# Patient Record
Sex: FEMALE | Race: WHITE | Employment: OTHER | URBAN - METROPOLITAN AREA
[De-identification: names, ages, dates, MRNs, and addresses within clinical notes are randomized per-mention and may not be internally consistent; named-entity substitution may affect disease eponyms.]

---

## 2018-03-16 PROCEDURE — 81001 URINALYSIS AUTO W/SCOPE: CPT | Performed by: FAMILY MEDICINE

## 2018-05-21 ENCOUNTER — LAB ENCOUNTER (OUTPATIENT)
Dept: LAB | Age: 68
End: 2018-05-21
Attending: PODIATRIST
Payer: MEDICARE

## 2018-05-21 DIAGNOSIS — L03.111 CELLULITIS OF RIGHT AXILLA: Primary | ICD-10-CM

## 2018-05-21 PROCEDURE — 85652 RBC SED RATE AUTOMATED: CPT

## 2018-05-21 PROCEDURE — 36415 COLL VENOUS BLD VENIPUNCTURE: CPT

## 2018-05-21 PROCEDURE — 86140 C-REACTIVE PROTEIN: CPT

## 2018-05-21 PROCEDURE — 85025 COMPLETE CBC W/AUTO DIFF WBC: CPT

## 2018-05-21 PROCEDURE — 84550 ASSAY OF BLOOD/URIC ACID: CPT

## 2018-10-15 PROBLEM — Z90.710 H/O ABDOMINAL HYSTERECTOMY: Status: ACTIVE | Noted: 2018-10-15

## 2019-07-08 PROCEDURE — 81001 URINALYSIS AUTO W/SCOPE: CPT | Performed by: FAMILY MEDICINE

## 2019-07-18 PROBLEM — Z90.710 H/O ABDOMINAL HYSTERECTOMY: Status: RESOLVED | Noted: 2018-10-15 | Resolved: 2019-07-18

## 2019-07-24 ENCOUNTER — OFFICE VISIT (OUTPATIENT)
Dept: FAMILY MEDICINE CLINIC | Facility: CLINIC | Age: 69
End: 2019-07-24
Payer: MEDICARE

## 2019-07-24 VITALS
SYSTOLIC BLOOD PRESSURE: 160 MMHG | TEMPERATURE: 98 F | HEIGHT: 61.5 IN | HEART RATE: 82 BPM | BODY MASS INDEX: 24.79 KG/M2 | OXYGEN SATURATION: 98 % | RESPIRATION RATE: 18 BRPM | WEIGHT: 133 LBS | DIASTOLIC BLOOD PRESSURE: 90 MMHG

## 2019-07-24 DIAGNOSIS — R39.9 UTI SYMPTOMS: ICD-10-CM

## 2019-07-24 DIAGNOSIS — N30.00 ACUTE CYSTITIS WITHOUT HEMATURIA: Primary | ICD-10-CM

## 2019-07-24 LAB
BILIRUBIN: NEGATIVE
GLUCOSE (URINE DIPSTICK): NEGATIVE MG/DL
KETONES (URINE DIPSTICK): NEGATIVE MG/DL
MULTISTIX LOT#: NORMAL NUMERIC
NITRITE, URINE: NEGATIVE
PH, URINE: 7.5 (ref 4.5–8)
PROTEIN (URINE DIPSTICK): NEGATIVE MG/DL
SPECIFIC GRAVITY: 1.01 (ref 1–1.03)
URINE-COLOR: YELLOW
UROBILINOGEN,SEMI-QN: 0.2 MG/DL (ref 0–1.9)

## 2019-07-24 PROCEDURE — 87086 URINE CULTURE/COLONY COUNT: CPT | Performed by: NURSE PRACTITIONER

## 2019-07-24 PROCEDURE — 99202 OFFICE O/P NEW SF 15 MIN: CPT | Performed by: NURSE PRACTITIONER

## 2019-07-24 PROCEDURE — 81003 URINALYSIS AUTO W/O SCOPE: CPT | Performed by: NURSE PRACTITIONER

## 2019-07-24 RX ORDER — NITROFURANTOIN 25; 75 MG/1; MG/1
100 CAPSULE ORAL 2 TIMES DAILY
Qty: 14 CAPSULE | Refills: 0 | Status: SHIPPED | OUTPATIENT
Start: 2019-07-24 | End: 2019-07-31

## 2019-07-24 NOTE — PROGRESS NOTES
CHIEF COMPLAINT:   Patient presents with:  Urinary Frequency: s/s for 4 days. No OTC meds taken      HPI:   Lea Kirby is a 78year old female who presents with symptoms of UTI. Complaining of urinary frequency, urgency, dysuria for 4 days.   Symptom Drug use: No        REVIEW OF SYSTEMS:   GENERAL: See above  SKIN: no rashes, no skin wounds or ulcers. GI: See HPI. : See HPI. NEURO: no headaches.     EXAM:   /90   Pulse 82   Temp 98.1 °F (36.7 °C) (Oral)   Resp 18   Ht 61.5\"   Wt 133 lb The patient is asked to see PCP in 3 days if not better. Seek care immediately for new onset of fever, vomiting, worsening symptoms.     Patient Instructions       Understanding Urinary Tract Infections (UTIs)  Most UTIs are caused by bacteria, although the

## 2019-08-08 ENCOUNTER — HOSPITAL ENCOUNTER (OUTPATIENT)
Dept: GENERAL RADIOLOGY | Age: 69
Discharge: HOME | End: 2019-08-08
Attending: NURSE PRACTITIONER
Payer: MEDICARE

## 2019-08-08 DIAGNOSIS — K44.9 HIATAL HERNIA: ICD-10-CM

## 2019-08-08 PROCEDURE — 74220 X-RAY XM ESOPHAGUS 1CNTRST: CPT | Performed by: NURSE PRACTITIONER

## 2019-10-17 ENCOUNTER — APPOINTMENT (OUTPATIENT)
Dept: LAB | Age: 69
End: 2019-10-17
Payer: MEDICARE

## 2019-10-17 DIAGNOSIS — E87.6 HYPOKALEMIA: ICD-10-CM

## 2019-10-17 DIAGNOSIS — E78.00 PURE HYPERCHOLESTEROLEMIA: ICD-10-CM

## 2019-10-17 DIAGNOSIS — K44.9 PARAESOPHAGEAL HERNIA: ICD-10-CM

## 2019-10-17 PROCEDURE — 93010 ELECTROCARDIOGRAM REPORT: CPT | Performed by: INTERNAL MEDICINE

## 2019-10-17 PROCEDURE — 80053 COMPREHEN METABOLIC PANEL: CPT

## 2019-10-17 PROCEDURE — 36415 COLL VENOUS BLD VENIPUNCTURE: CPT

## 2019-10-17 PROCEDURE — 93005 ELECTROCARDIOGRAM TRACING: CPT

## 2019-10-17 PROCEDURE — 80061 LIPID PANEL: CPT

## 2019-10-18 ENCOUNTER — ANESTHESIA EVENT (OUTPATIENT)
Dept: SURGERY | Facility: HOSPITAL | Age: 69
End: 2019-10-18

## 2019-10-18 NOTE — PAT NURSING NOTE
Chart reviewed by Dr. Kavon Cano for low K+ and abnormal EKG. Received an order to notify PCP of K+ and request oral supplementation to correct over the weekend. Repeat K+ on admit. Faxed the lab results to Dr. Mary Carmen Hinton and telephoned his office.  I spoke with

## 2019-10-21 ENCOUNTER — HOSPITAL ENCOUNTER (INPATIENT)
Facility: HOSPITAL | Age: 69
LOS: 2 days | Discharge: HOME | DRG: 327 | End: 2019-10-23
Attending: SURGERY | Admitting: SURGERY
Payer: MEDICARE

## 2019-10-21 ENCOUNTER — ANESTHESIA (OUTPATIENT)
Dept: SURGERY | Facility: HOSPITAL | Age: 69
End: 2019-10-21

## 2019-10-21 DIAGNOSIS — K44.9 PARAESOPHAGEAL HERNIA: Primary | ICD-10-CM

## 2019-10-21 PROCEDURE — 84132 ASSAY OF SERUM POTASSIUM: CPT | Performed by: ANESTHESIOLOGY

## 2019-10-21 PROCEDURE — 0BQT0ZZ REPAIR DIAPHRAGM, OPEN APPROACH: ICD-10-PCS | Performed by: SURGERY

## 2019-10-21 PROCEDURE — 0DV40ZZ RESTRICTION OF ESOPHAGOGASTRIC JUNCTION, OPEN APPROACH: ICD-10-PCS | Performed by: SURGERY

## 2019-10-21 PROCEDURE — 8E0W0CZ ROBOTIC ASSISTED PROCEDURE OF TRUNK REGION, OPEN APPROACH: ICD-10-PCS | Performed by: SURGERY

## 2019-10-21 RX ORDER — HYDROCODONE BITARTRATE AND ACETAMINOPHEN 5; 325 MG/1; MG/1
1 TABLET ORAL EVERY 4 HOURS PRN
Status: DISCONTINUED | OUTPATIENT
Start: 2019-10-21 | End: 2019-10-23

## 2019-10-21 RX ORDER — HYDROMORPHONE HYDROCHLORIDE 1 MG/ML
0.4 INJECTION, SOLUTION INTRAMUSCULAR; INTRAVENOUS; SUBCUTANEOUS EVERY 5 MIN PRN
Status: DISCONTINUED | OUTPATIENT
Start: 2019-10-21 | End: 2019-10-21 | Stop reason: HOSPADM

## 2019-10-21 RX ORDER — METOPROLOL TARTRATE 5 MG/5ML
5 INJECTION INTRAVENOUS EVERY 12 HOURS
Status: DISCONTINUED | OUTPATIENT
Start: 2019-10-21 | End: 2019-10-22

## 2019-10-21 RX ORDER — HYDROCODONE BITARTRATE AND ACETAMINOPHEN 5; 325 MG/1; MG/1
2 TABLET ORAL EVERY 4 HOURS PRN
Status: DISCONTINUED | OUTPATIENT
Start: 2019-10-21 | End: 2019-10-23

## 2019-10-21 RX ORDER — HEPARIN SODIUM 5000 [USP'U]/ML
5000 INJECTION, SOLUTION INTRAVENOUS; SUBCUTANEOUS ONCE
Status: COMPLETED | OUTPATIENT
Start: 2019-10-21 | End: 2019-10-21

## 2019-10-21 RX ORDER — KETOROLAC TROMETHAMINE 30 MG/ML
30 INJECTION, SOLUTION INTRAMUSCULAR; INTRAVENOUS EVERY 6 HOURS PRN
Status: DISPENSED | OUTPATIENT
Start: 2019-10-21 | End: 2019-10-23

## 2019-10-21 RX ORDER — MIDAZOLAM HYDROCHLORIDE 1 MG/ML
1 INJECTION INTRAMUSCULAR; INTRAVENOUS EVERY 5 MIN PRN
Status: DISCONTINUED | OUTPATIENT
Start: 2019-10-21 | End: 2019-10-21 | Stop reason: HOSPADM

## 2019-10-21 RX ORDER — MEPERIDINE HYDROCHLORIDE 25 MG/ML
12.5 INJECTION INTRAMUSCULAR; INTRAVENOUS; SUBCUTANEOUS AS NEEDED
Status: DISCONTINUED | OUTPATIENT
Start: 2019-10-21 | End: 2019-10-21 | Stop reason: HOSPADM

## 2019-10-21 RX ORDER — SODIUM CHLORIDE, SODIUM LACTATE, POTASSIUM CHLORIDE, CALCIUM CHLORIDE 600; 310; 30; 20 MG/100ML; MG/100ML; MG/100ML; MG/100ML
INJECTION, SOLUTION INTRAVENOUS CONTINUOUS
Status: DISCONTINUED | OUTPATIENT
Start: 2019-10-21 | End: 2019-10-21 | Stop reason: HOSPADM

## 2019-10-21 RX ORDER — ONDANSETRON 2 MG/ML
4 INJECTION INTRAMUSCULAR; INTRAVENOUS EVERY 6 HOURS PRN
Status: DISCONTINUED | OUTPATIENT
Start: 2019-10-21 | End: 2019-10-23

## 2019-10-21 RX ORDER — LABETALOL HYDROCHLORIDE 5 MG/ML
5 INJECTION, SOLUTION INTRAVENOUS EVERY 5 MIN PRN
Status: DISCONTINUED | OUTPATIENT
Start: 2019-10-21 | End: 2019-10-21 | Stop reason: HOSPADM

## 2019-10-21 RX ORDER — BUPIVACAINE HYDROCHLORIDE AND EPINEPHRINE 5; 5 MG/ML; UG/ML
INJECTION, SOLUTION EPIDURAL; INTRACAUDAL; PERINEURAL AS NEEDED
Status: DISCONTINUED | OUTPATIENT
Start: 2019-10-21 | End: 2019-10-21

## 2019-10-21 RX ORDER — BACITRACIN 50000 [USP'U]/1
INJECTION, POWDER, LYOPHILIZED, FOR SOLUTION INTRAMUSCULAR AS NEEDED
Status: DISCONTINUED | OUTPATIENT
Start: 2019-10-21 | End: 2019-10-21

## 2019-10-21 RX ORDER — HEPARIN SODIUM 5000 [USP'U]/ML
5000 INJECTION, SOLUTION INTRAVENOUS; SUBCUTANEOUS EVERY 12 HOURS SCHEDULED
Status: DISCONTINUED | OUTPATIENT
Start: 2019-10-21 | End: 2019-10-23

## 2019-10-21 RX ORDER — BENAZEPRIL/HYDROCHLOROTHIAZIDE 20 MG-25MG
1 TABLET ORAL DAILY
Status: ON HOLD | COMMUNITY
End: 2019-10-23

## 2019-10-21 RX ORDER — DEXTROSE, SODIUM CHLORIDE, AND POTASSIUM CHLORIDE 5; .45; .15 G/100ML; G/100ML; G/100ML
INJECTION INTRAVENOUS CONTINUOUS
Status: DISCONTINUED | OUTPATIENT
Start: 2019-10-21 | End: 2019-10-23

## 2019-10-21 RX ORDER — KETOROLAC TROMETHAMINE 15 MG/ML
15 INJECTION, SOLUTION INTRAMUSCULAR; INTRAVENOUS EVERY 6 HOURS PRN
Status: DISPENSED | OUTPATIENT
Start: 2019-10-21 | End: 2019-10-23

## 2019-10-21 RX ORDER — ACETAMINOPHEN 500 MG
1000 TABLET ORAL ONCE
Status: DISCONTINUED | OUTPATIENT
Start: 2019-10-21 | End: 2019-10-21

## 2019-10-21 RX ORDER — HYDROMORPHONE HYDROCHLORIDE 1 MG/ML
0.8 INJECTION, SOLUTION INTRAMUSCULAR; INTRAVENOUS; SUBCUTANEOUS EVERY 2 HOUR PRN
Status: DISCONTINUED | OUTPATIENT
Start: 2019-10-21 | End: 2019-10-23

## 2019-10-21 RX ORDER — HYDROMORPHONE HYDROCHLORIDE 1 MG/ML
1.2 INJECTION, SOLUTION INTRAMUSCULAR; INTRAVENOUS; SUBCUTANEOUS EVERY 2 HOUR PRN
Status: DISCONTINUED | OUTPATIENT
Start: 2019-10-21 | End: 2019-10-23

## 2019-10-21 RX ORDER — CEFAZOLIN SODIUM/WATER 2 G/20 ML
2 SYRINGE (ML) INTRAVENOUS ONCE
Status: COMPLETED | OUTPATIENT
Start: 2019-10-21 | End: 2019-10-21

## 2019-10-21 RX ORDER — METOCLOPRAMIDE HYDROCHLORIDE 5 MG/ML
10 INJECTION INTRAMUSCULAR; INTRAVENOUS EVERY 6 HOURS PRN
Status: DISCONTINUED | OUTPATIENT
Start: 2019-10-21 | End: 2019-10-23

## 2019-10-21 RX ORDER — CEFAZOLIN SODIUM/WATER 2 G/20 ML
2 SYRINGE (ML) INTRAVENOUS EVERY 8 HOURS
Status: COMPLETED | OUTPATIENT
Start: 2019-10-21 | End: 2019-10-21

## 2019-10-21 RX ORDER — HYDROMORPHONE HYDROCHLORIDE 1 MG/ML
0.4 INJECTION, SOLUTION INTRAMUSCULAR; INTRAVENOUS; SUBCUTANEOUS EVERY 2 HOUR PRN
Status: DISCONTINUED | OUTPATIENT
Start: 2019-10-21 | End: 2019-10-23

## 2019-10-21 RX ORDER — ONDANSETRON 2 MG/ML
4 INJECTION INTRAMUSCULAR; INTRAVENOUS AS NEEDED
Status: DISCONTINUED | OUTPATIENT
Start: 2019-10-21 | End: 2019-10-21 | Stop reason: HOSPADM

## 2019-10-21 RX ORDER — NALOXONE HYDROCHLORIDE 0.4 MG/ML
80 INJECTION, SOLUTION INTRAMUSCULAR; INTRAVENOUS; SUBCUTANEOUS AS NEEDED
Status: DISCONTINUED | OUTPATIENT
Start: 2019-10-21 | End: 2019-10-21 | Stop reason: HOSPADM

## 2019-10-21 NOTE — ANESTHESIA POSTPROCEDURE EVALUATION
[address] Patient Status:  Surgery Admit - Inpt   Age/Gender 78year old female MRN [id number]   [facility] SURGERY Attending Mary Cunningham MD   Hosp Day # 0 PCP Zahira Alexander DO       Anesthesia Post-op Note    Procedu

## 2019-10-21 NOTE — PLAN OF CARE
Problem: Patient/Family Goals  Goal: Patient/Family Long Term Goal  Description  Patient's Long Term Goal: Pt wants to go home    Interventions:  - Early ambulation  - Pain control  - Tolerate diet  - See additional Care Plan goals for specific intervent based on assessment  - Modify environment to reduce risk of injury  - Provide assistive devices as appropriate  - Consider OT/PT consult to assist with strengthening/mobility  - Encourage toileting schedule  Outcome: Progressing     Problem: DISCHARGE PLAN

## 2019-10-21 NOTE — OPERATIVE REPORT
[facility]    PATIENT'S NAME: Adrienne Phillips   ATTENDING PHYSICIAN: Poli Shelby M.D. OPERATING PHYSICIAN: Poli Shelby M.D.    PATIENT ACCOUNT#:   [de-identified]    LOCATION:  OR  OR POOL ROOMS 2 EDWP 10  MEDICAL RECORD #:   [id number]       DATE OF WOJCIECH The left olga of the diaphragm was then identified. Once this plane was developed, a Penrose drain was placed around the esophagus through this window for retraction.   Further dissection of the fundus and cardia portion of the stomach was performed to all

## 2019-10-21 NOTE — BRIEF OP NOTE
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

## 2019-10-21 NOTE — H&P
HPI:     Autumn Cadena is a 78year old female who presents for evaluation of Paraesophageal hernia. Patient has had a known paraesophageal hernia for a few years. This was demonstrated on a CT scan in 2016.  Patient has had worsening reflux disease and so       No Known Allergies           Family History   Problem Relation Age of Onset   • Breast Cancer Mother 80         [de-identified]   • Cancer Brother           pancreatic         Social History    Tobacco Use      Smoking status: Never Smoker      Smokeless tobac

## 2019-10-21 NOTE — CONSULTS
General Medicine Consult      Reason for consult: Post op medical management    Consulted by: Dr. Odin Hancock    PCP: Vinh Rice DO      History of Present Illness: Patient is a 78year old female with PMH sig for HTN, HLD, GERD, depression, and anxiety who Tab, Take 1 tablet (40 mg total) by mouth once daily. , Disp: 90 tablet, Rfl: 3  clonazePAM 0.25 MG Oral Tablet Dispersible, Take 0.25 mg by mouth as needed.   , Disp: , Rfl:   Venlafaxine HCl ER (EFFEXOR XR) 150 MG Oral Capsule SR 24 Hr, Take 150 mg by mout Normal PMI. Abd: Abdomen soft, nontender, nondistended, no organomegaly, bowel sounds present. Lap incisional clean.    MSK: Full range of motion in extremities, no clubbing, no cyanosis  Skin: no rashes or lesions  Neuro:  Grossly intact, no sensory de

## 2019-10-21 NOTE — ANESTHESIA PREPROCEDURE EVALUATION
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

## 2019-10-22 ENCOUNTER — APPOINTMENT (OUTPATIENT)
Dept: GENERAL RADIOLOGY | Facility: HOSPITAL | Age: 69
DRG: 327 | End: 2019-10-22
Attending: SURGERY
Payer: MEDICARE

## 2019-10-22 PROCEDURE — 80048 BASIC METABOLIC PNL TOTAL CA: CPT | Performed by: SURGERY

## 2019-10-22 PROCEDURE — 85027 COMPLETE CBC AUTOMATED: CPT | Performed by: SURGERY

## 2019-10-22 PROCEDURE — 74220 X-RAY XM ESOPHAGUS 1CNTRST: CPT | Performed by: SURGERY

## 2019-10-22 RX ORDER — HYDRALAZINE HYDROCHLORIDE 20 MG/ML
10 INJECTION INTRAMUSCULAR; INTRAVENOUS EVERY 4 HOURS PRN
Status: DISCONTINUED | OUTPATIENT
Start: 2019-10-22 | End: 2019-10-23

## 2019-10-22 RX ORDER — VENLAFAXINE HYDROCHLORIDE 75 MG/1
150 CAPSULE, EXTENDED RELEASE ORAL DAILY
Status: DISCONTINUED | OUTPATIENT
Start: 2019-10-22 | End: 2019-10-23

## 2019-10-22 RX ORDER — METOPROLOL SUCCINATE 100 MG/1
100 TABLET, EXTENDED RELEASE ORAL
Status: DISCONTINUED | OUTPATIENT
Start: 2019-10-22 | End: 2019-10-23

## 2019-10-22 RX ORDER — METOPROLOL SUCCINATE 100 MG/1
100 TABLET, EXTENDED RELEASE ORAL DAILY
Status: DISCONTINUED | OUTPATIENT
Start: 2019-10-22 | End: 2019-10-22

## 2019-10-22 RX ORDER — ATORVASTATIN CALCIUM 20 MG/1
20 TABLET, FILM COATED ORAL NIGHTLY
Status: DISCONTINUED | OUTPATIENT
Start: 2019-10-22 | End: 2019-10-23

## 2019-10-22 RX ORDER — PANTOPRAZOLE SODIUM 20 MG/1
20 TABLET, DELAYED RELEASE ORAL
Status: DISCONTINUED | OUTPATIENT
Start: 2019-10-22 | End: 2019-10-23

## 2019-10-22 RX ORDER — BENAZEPRIL/HYDROCHLOROTHIAZIDE 20 MG-25MG
1 TABLET ORAL DAILY
Status: DISCONTINUED | OUTPATIENT
Start: 2019-10-22 | End: 2019-10-22 | Stop reason: SDUPTHER

## 2019-10-22 NOTE — PROGRESS NOTES
10/22/19 ORDER RECEIVED TO DISCONTINUE BARROS CATHETER AT THIS TIME. BARROS CATHETER REMOVED BY ROSE WASHINGTON WITHOUT ANY PROBLEMS REPORTED TO WRITER. PT DTV. PT AWARE AND AGREES. ALL QUESTIONS ANSWERED. CONT TO MONITOR.

## 2019-10-22 NOTE — PROGRESS NOTES
[facility]  Progress Note    Richie Black Patient Status:  Inpatient    1950 MRN [id number]   [facility] 3NW-A Attending Dina Meadows MD   [facility] Day # 1 PCP Rodriguez Gilbert DO     Subjective:    No complaints.  Tolerating clear liqu

## 2019-10-22 NOTE — PROGRESS NOTES
[facility] Hospitalist Progress Note                                                                   [address]  6/1/1950    CC: FU post op    Interval History:  - Doing well, starting CLD 27.0  --  25.0           Assessment/Plan:       78 yr old female with PMH sig for HTN, HLD, GERD, depression, and anxiety who presents s/p elective repair of a paraesophageal hernia.      #Paraesophageal hernia s/p repair   · Post op care per general surge

## 2019-10-23 VITALS
DIASTOLIC BLOOD PRESSURE: 59 MMHG | RESPIRATION RATE: 18 BRPM | HEART RATE: 70 BPM | SYSTOLIC BLOOD PRESSURE: 131 MMHG | WEIGHT: 133.81 LBS | OXYGEN SATURATION: 97 % | HEIGHT: 62 IN | BODY MASS INDEX: 24.63 KG/M2 | TEMPERATURE: 98 F

## 2019-10-23 RX ORDER — METOPROLOL SUCCINATE 100 MG/1
100 TABLET, EXTENDED RELEASE ORAL 2 TIMES DAILY
Qty: 90 TABLET | Refills: 0 | Status: SHIPPED | OUTPATIENT
Start: 2019-10-23 | End: 2019-12-22

## 2019-10-23 RX ORDER — LOSARTAN POTASSIUM 50 MG/1
50 TABLET ORAL DAILY
Status: DISCONTINUED | OUTPATIENT
Start: 2019-10-23 | End: 2019-10-23

## 2019-10-23 RX ORDER — HYDROCODONE BITARTRATE AND ACETAMINOPHEN 5; 325 MG/1; MG/1
1 TABLET ORAL EVERY 6 HOURS PRN
Qty: 20 TABLET | Refills: 0 | Status: SHIPPED | OUTPATIENT
Start: 2019-10-23 | End: 2020-02-26 | Stop reason: ALTCHOICE

## 2019-10-23 RX ORDER — AMLODIPINE BESYLATE 5 MG/1
10 TABLET ORAL DAILY
Status: DISCONTINUED | OUTPATIENT
Start: 2019-10-23 | End: 2019-10-23

## 2019-10-23 RX ORDER — LOSARTAN POTASSIUM 50 MG/1
50 TABLET ORAL DAILY
Qty: 30 TABLET | Refills: 0 | Status: SHIPPED | OUTPATIENT
Start: 2019-10-24 | End: 2019-11-21

## 2019-10-23 RX ORDER — AMLODIPINE BESYLATE 5 MG/1
5 TABLET ORAL DAILY
Status: DISCONTINUED | OUTPATIENT
Start: 2019-10-23 | End: 2019-10-23

## 2019-10-23 NOTE — PLAN OF CARE
Problem: Patient/Family Goals  Goal: Patient/Family Long Term Goal  Description  Patient's Long Term Goal: Pt wants to go home    Interventions:  - Early ambulation  - Pain control  - Tolerate diet  - See additional Care Plan goals for specific intervent based on assessment  - Modify environment to reduce risk of injury  - Provide assistive devices as appropriate  - Consider OT/PT consult to assist with strengthening/mobility  - Encourage toileting schedule  Outcome: Progressing     Problem: DISCHARGE PLAN Progressing     Problem: GASTROINTESTINAL - ADULT  Goal: Minimal or absence of nausea and vomiting  Description  INTERVENTIONS:  - Maintain adequate hydration with IV or PO as ordered and tolerated  - Nasogastric tube to low intermittent suction as ordered

## 2019-10-23 NOTE — PROGRESS NOTES
[facility] Hospitalist Progress Note                                                                   [address]  6/1/1950    CC: FU post op    Interval History:  - BP high overnight- home BP --  137   K 2.9* 3.4* 4.6     --  105   CO2 27.0  --  25.0           Assessment/Plan:       78 yr old female with PMH sig for HTN, HLD, GERD, depression, and anxiety who presents s/p elective repair of a paraesophageal hernia.      #Paraesophageal he

## 2019-10-23 NOTE — PROGRESS NOTES
[facility]  Progress Note    Marcellus Llanes Patient Status:  Inpatient    1950 MRN [id number]   [facility] 3NW-A Attending Vik Gutierrez MD   [facility] Day # 2 PCP Ronald Ferrer DO     Subjective:    Patient reports tolerating liquid di

## 2019-10-23 NOTE — PLAN OF CARE
Patient is alert and oriented x3  Up ad bella   Voids  Lap sites CDI to ABD  Passing gas  Had a BM overnight  Tolerating soft    Patient assessed and ready for DC home  DC instructions given to patient. Verbalized understanding.   All questions answered to pa Adequate for Discharge     Problem: SAFETY ADULT - FALL  Goal: Free from fall injury  Description  INTERVENTIONS:  - Assess pt frequently for physical needs  - Identify cognitive and physical deficits and behaviors that affect risk of falls.   - Cisco f Discharge  Goal: Incision(s), wounds(s) or drain site(s) healing without S/S of infection  Description  INTERVENTIONS:  - Assess and document risk factors for pressure ulcer development  - Assess and document skin integrity  - Assess and document dressing/

## 2019-10-23 NOTE — DIETARY NOTE
[facility]    NUTRITION INITIAL ASSESSMENT    Pt does not meet malnutrition criteria. NUTRITION DIAGNOSIS/PROBLEM:    Food and nutrition-related knowledge deficit related to lack of previous diet ed as evidenced by consult for Nissen diet for home. Yes    NUTRITION RELATED PHYSICAL FINDINGS:     1. Body Fat/Muscle Mass: well nourished per visual exam.    NUTRITION PRESCRIPTION:  Calories: 3635-6287 calories/day (25-30 calories per kg)  Protein: 61-76 grams protein/day (1.0-1.25 grams protein per kg)

## 2019-10-24 NOTE — DISCHARGE SUMMARY
[facility]  Discharge Summary    Fernando Mendoza Patient Status:  Inpatient    1950 MRN [id number]   [facility] 3NW-A Attending No att. providers found   Hosp Day # 2 PCP Sharon Brewer DO     Date of Admission: 10/21/2019    Date o daily., Normal, Disp-90 tablet, R-3    clonazePAM 0.25 MG Oral Tablet Dispersible  Take 0.25 mg by mouth as needed.  , Historical    Venlafaxine HCl ER (EFFEXOR XR) 150 MG Oral Capsule SR 24 Hr  Take 150 mg by mouth daily.   , Historical, Disp-180 capsule,

## 2020-03-11 ENCOUNTER — HOSPITAL ENCOUNTER (OUTPATIENT)
Dept: GENERAL RADIOLOGY | Age: 70
End: 2020-03-11
Attending: SURGERY
Payer: MEDICARE

## 2020-03-11 ENCOUNTER — HOSPITAL ENCOUNTER (OUTPATIENT)
Dept: GENERAL RADIOLOGY | Age: 70
Discharge: HOME | End: 2020-03-11
Attending: SURGERY
Payer: MEDICARE

## 2020-03-11 ENCOUNTER — APPOINTMENT (OUTPATIENT)
Dept: GENERAL RADIOLOGY | Age: 70
End: 2020-03-11
Attending: SURGERY
Payer: MEDICARE

## 2020-03-11 DIAGNOSIS — R47.02 DYSPHASIA: ICD-10-CM

## 2020-03-11 PROCEDURE — 74240 X-RAY XM UPR GI TRC 1CNTRST: CPT | Performed by: SURGERY

## 2020-10-29 PROBLEM — K44.9 HIATAL HERNIA: Status: ACTIVE | Noted: 2017-02-22

## 2020-10-29 PROBLEM — Z86.59 HISTORY OF DEPRESSION: Status: ACTIVE | Noted: 2017-02-22

## 2021-05-24 ENCOUNTER — RECORDS - HEALTHEAST (OUTPATIENT)
Dept: ADMINISTRATIVE | Facility: CLINIC | Age: 71
End: 2021-05-24

## 2021-05-27 ENCOUNTER — RECORDS - HEALTHEAST (OUTPATIENT)
Dept: ADMINISTRATIVE | Facility: CLINIC | Age: 71
End: 2021-05-27

## 2021-05-29 ENCOUNTER — RECORDS - HEALTHEAST (OUTPATIENT)
Dept: ADMINISTRATIVE | Facility: CLINIC | Age: 71
End: 2021-05-29

## 2021-06-01 ENCOUNTER — RECORDS - HEALTHEAST (OUTPATIENT)
Dept: ADMINISTRATIVE | Facility: CLINIC | Age: 71
End: 2021-06-01

## 2021-06-28 ENCOUNTER — LAB ENCOUNTER (OUTPATIENT)
Dept: LAB | Age: 71
End: 2021-06-28
Attending: PHYSICIAN ASSISTANT
Payer: MEDICARE

## 2021-06-28 DIAGNOSIS — D64.9 ANEMIA, UNSPECIFIED TYPE: ICD-10-CM

## 2021-06-28 DIAGNOSIS — Z98.890 HISTORY OF NISSEN FUNDOPLICATION: ICD-10-CM

## 2021-06-28 DIAGNOSIS — Z87.19 HISTORY OF HIATAL HERNIA: ICD-10-CM

## 2021-06-28 DIAGNOSIS — K21.9 GASTROESOPHAGEAL REFLUX DISEASE, UNSPECIFIED WHETHER ESOPHAGITIS PRESENT: ICD-10-CM

## 2021-06-28 LAB
ALBUMIN SERPL-MCNC: 3.6 G/DL (ref 3.4–5)
ALP LIVER SERPL-CCNC: 95 U/L
ALT SERPL-CCNC: 18 U/L
AST SERPL-CCNC: 15 U/L (ref 15–37)
BASOPHILS # BLD AUTO: 0.06 X10(3) UL (ref 0–0.2)
BASOPHILS NFR BLD AUTO: 1 %
BILIRUB DIRECT SERPL-MCNC: 0.2 MG/DL (ref 0–0.2)
BILIRUB SERPL-MCNC: 0.8 MG/DL (ref 0.1–2)
DEPRECATED RDW RBC AUTO: 52.9 FL (ref 35.1–46.3)
EOSINOPHIL # BLD AUTO: 0.08 X10(3) UL (ref 0–0.7)
EOSINOPHIL NFR BLD AUTO: 1.3 %
ERYTHROCYTE [DISTWIDTH] IN BLOOD BY AUTOMATED COUNT: 15.3 % (ref 11–15)
HCT VFR BLD AUTO: 43.3 %
HGB BLD-MCNC: 13.6 G/DL
IMM GRANULOCYTES # BLD AUTO: 0.01 X10(3) UL (ref 0–1)
IMM GRANULOCYTES NFR BLD: 0.2 %
LYMPHOCYTES # BLD AUTO: 1.81 X10(3) UL (ref 1–4)
LYMPHOCYTES NFR BLD AUTO: 29.5 %
M PROTEIN MFR SERPL ELPH: 7.2 G/DL (ref 6.4–8.2)
MCH RBC QN AUTO: 30.1 PG (ref 26–34)
MCHC RBC AUTO-ENTMCNC: 31.4 G/DL (ref 31–37)
MCV RBC AUTO: 95.8 FL
MONOCYTES # BLD AUTO: 0.59 X10(3) UL (ref 0.1–1)
MONOCYTES NFR BLD AUTO: 9.6 %
NEUTROPHILS # BLD AUTO: 3.59 X10 (3) UL (ref 1.5–7.7)
NEUTROPHILS # BLD AUTO: 3.59 X10(3) UL (ref 1.5–7.7)
NEUTROPHILS NFR BLD AUTO: 58.4 %
PLATELET # BLD AUTO: 244 10(3)UL (ref 150–450)
RBC # BLD AUTO: 4.52 X10(6)UL
WBC # BLD AUTO: 6.1 X10(3) UL (ref 4–11)

## 2021-06-28 PROCEDURE — 36415 COLL VENOUS BLD VENIPUNCTURE: CPT

## 2021-06-28 PROCEDURE — 80076 HEPATIC FUNCTION PANEL: CPT

## 2021-06-28 PROCEDURE — 85025 COMPLETE CBC W/AUTO DIFF WBC: CPT

## 2022-03-15 ENCOUNTER — LAB ENCOUNTER (OUTPATIENT)
Dept: LAB | Age: 72
End: 2022-03-15
Attending: NURSE PRACTITIONER
Payer: MEDICARE

## 2022-03-15 DIAGNOSIS — R13.19 ESOPHAGEAL DYSPHAGIA: ICD-10-CM

## 2022-03-15 DIAGNOSIS — R07.89 ATYPICAL CHEST PAIN: ICD-10-CM

## 2022-03-15 DIAGNOSIS — Z01.818 PRE-PROCEDURAL EXAMINATION: ICD-10-CM

## 2022-03-15 LAB — SARS-COV-2 RNA RESP QL NAA+PROBE: NOT DETECTED

## 2022-03-18 ENCOUNTER — HOSPITAL ENCOUNTER (OUTPATIENT)
Dept: GENERAL RADIOLOGY | Age: 72
Discharge: HOME | End: 2022-03-18
Attending: NURSE PRACTITIONER
Payer: MEDICARE

## 2022-03-18 DIAGNOSIS — Z98.890 HISTORY OF NISSEN FUNDOPLICATION: ICD-10-CM

## 2022-03-18 DIAGNOSIS — Z87.19 HISTORY OF REPAIR OF HIATAL HERNIA: ICD-10-CM

## 2022-03-18 DIAGNOSIS — Z98.890 HISTORY OF REPAIR OF HIATAL HERNIA: ICD-10-CM

## 2022-03-18 DIAGNOSIS — R13.19 ESOPHAGEAL DYSPHAGIA: ICD-10-CM

## 2022-03-18 DIAGNOSIS — R07.89 ATYPICAL CHEST PAIN: ICD-10-CM

## 2022-03-18 PROCEDURE — 74220 X-RAY XM ESOPHAGUS 1CNTRST: CPT | Performed by: NURSE PRACTITIONER

## 2022-05-16 ENCOUNTER — LAB ENCOUNTER (OUTPATIENT)
Dept: LAB | Age: 72
End: 2022-05-16
Attending: INTERNAL MEDICINE
Payer: MEDICARE

## 2022-05-16 ENCOUNTER — LABORATORY ENCOUNTER (OUTPATIENT)
Dept: LAB | Age: 72
End: 2022-05-16
Attending: INTERNAL MEDICINE
Payer: MEDICARE

## 2022-05-16 DIAGNOSIS — Z20.822 ENCOUNTER FOR PREOPERATIVE SCREENING LABORATORY TESTING FOR COVID-19 VIRUS: ICD-10-CM

## 2022-05-16 DIAGNOSIS — K21.9 GASTROESOPHAGEAL REFLUX DISEASE, UNSPECIFIED WHETHER ESOPHAGITIS PRESENT: ICD-10-CM

## 2022-05-16 DIAGNOSIS — Z01.812 ENCOUNTER FOR PREOPERATIVE SCREENING LABORATORY TESTING FOR COVID-19 VIRUS: ICD-10-CM

## 2022-05-16 LAB
CHLORIDE SERPL-SCNC: 106 MMOL/L (ref 98–112)
CO2 SERPL-SCNC: 23 MMOL/L (ref 21–32)
POTASSIUM SERPL-SCNC: 3.3 MMOL/L (ref 3.5–5.1)
SODIUM SERPL-SCNC: 139 MMOL/L (ref 136–145)

## 2022-05-16 PROCEDURE — 36415 COLL VENOUS BLD VENIPUNCTURE: CPT

## 2022-05-16 PROCEDURE — 80051 ELECTROLYTE PANEL: CPT

## 2022-05-17 LAB — SARS-COV-2 RNA RESP QL NAA+PROBE: NOT DETECTED

## 2022-05-19 ENCOUNTER — ANESTHESIA EVENT (OUTPATIENT)
Dept: ENDOSCOPY | Facility: HOSPITAL | Age: 72
End: 2022-05-19
Payer: MEDICARE

## 2022-05-19 ENCOUNTER — ANESTHESIA (OUTPATIENT)
Dept: ENDOSCOPY | Facility: HOSPITAL | Age: 72
End: 2022-05-19
Payer: MEDICARE

## 2022-05-19 ENCOUNTER — HOSPITAL ENCOUNTER (OUTPATIENT)
Facility: HOSPITAL | Age: 72
Setting detail: HOSPITAL OUTPATIENT SURGERY
Discharge: HOME | End: 2022-05-19
Attending: INTERNAL MEDICINE | Admitting: INTERNAL MEDICINE
Payer: MEDICARE

## 2022-05-19 VITALS
HEIGHT: 62 IN | OXYGEN SATURATION: 100 % | TEMPERATURE: 98 F | DIASTOLIC BLOOD PRESSURE: 68 MMHG | HEART RATE: 64 BPM | SYSTOLIC BLOOD PRESSURE: 174 MMHG | BODY MASS INDEX: 22.63 KG/M2 | WEIGHT: 123 LBS | RESPIRATION RATE: 15 BRPM

## 2022-05-19 DIAGNOSIS — R13.19 ESOPHAGEAL DYSPHAGIA: ICD-10-CM

## 2022-05-19 DIAGNOSIS — Z98.890 HISTORY OF NISSEN FUNDOPLICATION: ICD-10-CM

## 2022-05-19 DIAGNOSIS — K21.9 GASTROESOPHAGEAL REFLUX DISEASE, UNSPECIFIED WHETHER ESOPHAGITIS PRESENT: ICD-10-CM

## 2022-05-19 DIAGNOSIS — Z01.818 PRE-PROCEDURAL EXAMINATION: ICD-10-CM

## 2022-05-19 DIAGNOSIS — Z20.822 ENCOUNTER FOR PREOPERATIVE SCREENING LABORATORY TESTING FOR COVID-19 VIRUS: Primary | ICD-10-CM

## 2022-05-19 DIAGNOSIS — Z98.890 HISTORY OF REPAIR OF HIATAL HERNIA: ICD-10-CM

## 2022-05-19 DIAGNOSIS — Z87.19 HISTORY OF REPAIR OF HIATAL HERNIA: ICD-10-CM

## 2022-05-19 DIAGNOSIS — Z01.812 ENCOUNTER FOR PREOPERATIVE SCREENING LABORATORY TESTING FOR COVID-19 VIRUS: Primary | ICD-10-CM

## 2022-05-19 DIAGNOSIS — R07.89 ATYPICAL CHEST PAIN: ICD-10-CM

## 2022-05-19 DIAGNOSIS — Z86.010 HISTORY OF COLON POLYPS: ICD-10-CM

## 2022-05-19 PROCEDURE — 0DB98ZX EXCISION OF DUODENUM, VIA NATURAL OR ARTIFICIAL OPENING ENDOSCOPIC, DIAGNOSTIC: ICD-10-PCS | Performed by: INTERNAL MEDICINE

## 2022-05-19 PROCEDURE — 0DB58ZX EXCISION OF ESOPHAGUS, VIA NATURAL OR ARTIFICIAL OPENING ENDOSCOPIC, DIAGNOSTIC: ICD-10-PCS | Performed by: INTERNAL MEDICINE

## 2022-05-19 PROCEDURE — 0D738ZZ DILATION OF LOWER ESOPHAGUS, VIA NATURAL OR ARTIFICIAL OPENING ENDOSCOPIC: ICD-10-PCS | Performed by: INTERNAL MEDICINE

## 2022-05-19 PROCEDURE — 0DBN8ZX EXCISION OF SIGMOID COLON, VIA NATURAL OR ARTIFICIAL OPENING ENDOSCOPIC, DIAGNOSTIC: ICD-10-PCS | Performed by: INTERNAL MEDICINE

## 2022-05-19 PROCEDURE — 0DBH8ZX EXCISION OF CECUM, VIA NATURAL OR ARTIFICIAL OPENING ENDOSCOPIC, DIAGNOSTIC: ICD-10-PCS | Performed by: INTERNAL MEDICINE

## 2022-05-19 PROCEDURE — 88305 TISSUE EXAM BY PATHOLOGIST: CPT | Performed by: INTERNAL MEDICINE

## 2022-05-19 PROCEDURE — 0DB78ZX EXCISION OF STOMACH, PYLORUS, VIA NATURAL OR ARTIFICIAL OPENING ENDOSCOPIC, DIAGNOSTIC: ICD-10-PCS | Performed by: INTERNAL MEDICINE

## 2022-05-19 RX ORDER — SODIUM CHLORIDE, SODIUM LACTATE, POTASSIUM CHLORIDE, CALCIUM CHLORIDE 600; 310; 30; 20 MG/100ML; MG/100ML; MG/100ML; MG/100ML
INJECTION, SOLUTION INTRAVENOUS CONTINUOUS
Status: DISCONTINUED | OUTPATIENT
Start: 2022-05-19 | End: 2022-05-19

## 2022-05-19 RX ORDER — LIDOCAINE HYDROCHLORIDE 10 MG/ML
INJECTION, SOLUTION EPIDURAL; INFILTRATION; INTRACAUDAL; PERINEURAL AS NEEDED
Status: DISCONTINUED | OUTPATIENT
Start: 2022-05-19 | End: 2022-05-19 | Stop reason: SURG

## 2022-05-19 RX ADMIN — SODIUM CHLORIDE, SODIUM LACTATE, POTASSIUM CHLORIDE, CALCIUM CHLORIDE: 600; 310; 30; 20 INJECTION, SOLUTION INTRAVENOUS at 11:55:00

## 2022-05-19 RX ADMIN — LIDOCAINE HYDROCHLORIDE 25 MG: 10 INJECTION, SOLUTION EPIDURAL; INFILTRATION; INTRACAUDAL; PERINEURAL at 11:58:00

## 2022-05-19 NOTE — OPERATIVE REPORT
Gloria Lim Patient Status:  Hospital Outpatient Surgery    1950 MRN [id number]   Location [id number] [place] [address] Attending Lenn Duverney, MD   Date 2022 PCP Roselia Gabriel DO     PREOPERATIVE DIAGNOSIS/INDICATION: H/o polyps  POSTOPERTATIVE DIAGNOSIS: Colon polyps  PROCEDURE PERFORMED: COLONOSCOPY with biopsy  SEDATION: MAC sedation provided by General Anesthesia    TIME OUT WAS PERFORMED    INFORMED CONSENT: Risks, benefits and alternatives to the procedure were explained to the patient including but not limited to bleeding, infection, perforation, adverse drug reactions, pancreatitis and the need for hospitalization and surgery if this occurs, the patient understands and agrees to procedure. PROCEDURE DESCRIPTION: After careful digital rectal examination a pediatric colonoscope was introduced into the patients rectum, advanced pass the recto sigmoid junction, into the descending colon, splenic flexure, transverse colon, hepatic flexure, ascending colon, cecum and the last 5-10cm of the terminal ileum, confirmed by landmarks, including the appendiceal orifice and ileocecal valve. Careful examination of the above described areas was performed on withdrawal of the endoscope. Retroflexion was performed on the rectum. The patient tolerated the procedure well, there were no immediate complication immediately following the procedure, and the patient was transferred to recovery in stable condition.   QUALITY OF PREPARATION: [place] Bowel Preparation Scale:            -      Right colon 3, Transverse colon 3, Left colon 3   FINDINGS/THERAPEUTICS:  - TERMINAL ILEUM; Normal  - COLON: 2 mm flat cecal polyp s/p excisional biopsy with cold forceps, 2 mm sessile sigmoid polyp s/p excisional biopsy with cold forceps  - RECTUM: Grade2 Internal hemorrhoids  RECOMMENDATIONS:   - Post Colonoscopy/polypectomy precautions, watch for bleeding, infection, perforation, adverse drug reactions   - Follow biopsies.  - Repeat colonoscopy in 5-7 years if clinically indicated at that time.     Sheila Hampton MD  5/19/2022  12:26 PM

## 2022-05-19 NOTE — ANESTHESIA POSTPROCEDURE EVALUATION
[address] Patient Status:  Hospital Outpatient Surgery   Age/Gender 66year old female MRN [id number]   Location [address] [address] Attending Lenn Duverney, MD   Hosp Day # 0 PCP Roselia Gabriel DO       Anesthesia Post-op Note    ESOPHAGOGASTRODUODENOSCOPY with biopsies and balloon dilation to 18mm, COLONOSCOPY with forcep polypectomy     Procedure Summary     Date: 05/19/22 Room / Location: [facility] ENDOSCOPY 02 / 1404 [facility] ENDOSCOPY    Anesthesia Start: 9734 Anesthesia Stop: 7092    Procedures:       ESOPHAGOGASTRODUODENOSCOPY with biopsies and balloon dilation to 18mm, COLONOSCOPY with forcep polypectomy (N/A )      COLONOSCOPY (N/A ) Diagnosis:       Esophageal dysphagia      Atypical chest pain      Pre-procedural examination      History of Nissen fundoplication      History of repair of hiatal hernia      History of colon polyps      (EGD: hiatal herna, esophageal stricture, surgical changes COLON: polyps)    Surgeons: Lenn Duverney, MD Anesthesiologist: Deborah Crain MD    Anesthesia Type: MAC ASA Status: 2          Anesthesia Type: MAC    Vitals Value Taken Time   /59 05/19/22 1236   Temp 98.0 05/19/22 1236   Pulse 66 05/19/22 1236   Resp 16 05/19/22 1236   SpO2 87 % 05/19/22 1236   Vitals shown include unvalidated device data. Patient Location: Endoscopy    Anesthesia Type: MAC    Airway Patency: patent    Postop Pain Control: adequate    Mental Status: mildly sedated but able to meaningfully participate in the post-anesthesia evaluation    Nausea/Vomiting: none    Cardiopulmonary/Hydration status: stable euvolemic    Complications: no apparent anesthesia related complications    Postop vital signs: stable    Dental Exam: Unchanged from Preop    Patient to be discharged from PACU when criteria met.

## 2022-05-26 NOTE — PROGRESS NOTES
Date: 2022    To: Danitza Houser  : 1950     I hope this letter finds you doing well. I am writing to inform you of the following: The biopsies obtained at the time of your recent upper endoscopic procedure were benign and showed no evidence of infection or malignancy. The biopsies obtained from your recent colonoscopy indicate that the polyp is a hyperplastic polyp which is benign. I am advising you to undergo repeat colonoscopy in 10 years if clinically indicated at that time. We will send you a reminder card when the time of your procedure is near. Please call the office at [phone] if there are any questions.     Jacklyn Alvaardo M.D.

## 2023-08-02 PROBLEM — F32.1 CURRENT MODERATE EPISODE OF MAJOR DEPRESSIVE DISORDER WITHOUT PRIOR EPISODE (HCC): Status: ACTIVE | Noted: 2023-05-19

## 2023-10-26 ENCOUNTER — OFFICE VISIT (OUTPATIENT)
Dept: INTERNAL MEDICINE CLINIC | Facility: CLINIC | Age: 73
End: 2023-10-26

## 2023-10-26 VITALS
WEIGHT: 134.19 LBS | OXYGEN SATURATION: 96 % | TEMPERATURE: 99 F | DIASTOLIC BLOOD PRESSURE: 80 MMHG | SYSTOLIC BLOOD PRESSURE: 158 MMHG | BODY MASS INDEX: 24.38 KG/M2 | RESPIRATION RATE: 16 BRPM | HEART RATE: 64 BPM | HEIGHT: 62.21 IN

## 2023-10-26 DIAGNOSIS — I10 ESSENTIAL HYPERTENSION: Primary | ICD-10-CM

## 2023-10-26 DIAGNOSIS — Z12.31 ENCOUNTER FOR SCREENING MAMMOGRAM FOR MALIGNANT NEOPLASM OF BREAST: ICD-10-CM

## 2023-10-26 DIAGNOSIS — M25.562 ACUTE PAIN OF LEFT KNEE: ICD-10-CM

## 2023-10-26 DIAGNOSIS — F32.1 CURRENT MODERATE EPISODE OF MAJOR DEPRESSIVE DISORDER WITHOUT PRIOR EPISODE (HCC): ICD-10-CM

## 2023-10-26 DIAGNOSIS — Z23 NEED FOR INFLUENZA VACCINATION: ICD-10-CM

## 2023-10-26 DIAGNOSIS — F41.9 ANXIETY: ICD-10-CM

## 2023-10-26 DIAGNOSIS — K21.9 GASTROESOPHAGEAL REFLUX DISEASE, UNSPECIFIED WHETHER ESOPHAGITIS PRESENT: ICD-10-CM

## 2023-10-26 DIAGNOSIS — E78.00 PURE HYPERCHOLESTEROLEMIA: ICD-10-CM

## 2023-10-26 PROCEDURE — 1160F RVW MEDS BY RX/DR IN RCRD: CPT | Performed by: INTERNAL MEDICINE

## 2023-10-26 PROCEDURE — 3008F BODY MASS INDEX DOCD: CPT | Performed by: INTERNAL MEDICINE

## 2023-10-26 PROCEDURE — 90662 IIV NO PRSV INCREASED AG IM: CPT | Performed by: INTERNAL MEDICINE

## 2023-10-26 PROCEDURE — G0008 ADMIN INFLUENZA VIRUS VAC: HCPCS | Performed by: INTERNAL MEDICINE

## 2023-10-26 PROCEDURE — 1159F MED LIST DOCD IN RCRD: CPT | Performed by: INTERNAL MEDICINE

## 2023-10-26 PROCEDURE — 1170F FXNL STATUS ASSESSED: CPT | Performed by: INTERNAL MEDICINE

## 2023-10-26 PROCEDURE — 1126F AMNT PAIN NOTED NONE PRSNT: CPT | Performed by: INTERNAL MEDICINE

## 2023-10-26 PROCEDURE — 3077F SYST BP >= 140 MM HG: CPT | Performed by: INTERNAL MEDICINE

## 2023-10-26 PROCEDURE — 3079F DIAST BP 80-89 MM HG: CPT | Performed by: INTERNAL MEDICINE

## 2023-10-26 PROCEDURE — 99204 OFFICE O/P NEW MOD 45 MIN: CPT | Performed by: INTERNAL MEDICINE

## 2023-10-26 RX ORDER — LOSARTAN POTASSIUM AND HYDROCHLOROTHIAZIDE 25; 100 MG/1; MG/1
1 TABLET ORAL DAILY
Qty: 90 TABLET | Refills: 1 | Status: SHIPPED | OUTPATIENT
Start: 2023-10-26 | End: 2024-10-20

## 2023-10-31 ENCOUNTER — HOSPITAL ENCOUNTER (OUTPATIENT)
Dept: MAMMOGRAPHY | Age: 73
Discharge: HOME | End: 2023-10-31
Attending: INTERNAL MEDICINE
Payer: MEDICARE

## 2023-10-31 DIAGNOSIS — Z12.31 ENCOUNTER FOR SCREENING MAMMOGRAM FOR MALIGNANT NEOPLASM OF BREAST: ICD-10-CM

## 2023-10-31 PROCEDURE — 77067 SCR MAMMO BI INCL CAD: CPT | Performed by: INTERNAL MEDICINE

## 2023-10-31 PROCEDURE — 77063 BREAST TOMOSYNTHESIS BI: CPT | Performed by: INTERNAL MEDICINE

## 2023-11-03 ENCOUNTER — LAB ENCOUNTER (OUTPATIENT)
Dept: LAB | Age: 73
End: 2023-11-03
Attending: INTERNAL MEDICINE
Payer: MEDICARE

## 2023-11-03 DIAGNOSIS — K21.9 GASTROESOPHAGEAL REFLUX DISEASE, UNSPECIFIED WHETHER ESOPHAGITIS PRESENT: ICD-10-CM

## 2023-11-03 DIAGNOSIS — I10 ESSENTIAL HYPERTENSION: ICD-10-CM

## 2023-11-03 DIAGNOSIS — E78.00 PURE HYPERCHOLESTEROLEMIA: ICD-10-CM

## 2023-11-03 LAB
ALBUMIN SERPL-MCNC: 3.4 G/DL (ref 3.4–5)
ALBUMIN/GLOB SERPL: 0.8 {RATIO} (ref 1–2)
ALP LIVER SERPL-CCNC: 123 U/L
ALT SERPL-CCNC: 22 U/L
ANION GAP SERPL CALC-SCNC: 6 MMOL/L (ref 0–18)
AST SERPL-CCNC: 18 U/L (ref 15–37)
BASOPHILS # BLD AUTO: 0.07 X10(3) UL (ref 0–0.2)
BASOPHILS NFR BLD AUTO: 0.8 %
BILIRUB SERPL-MCNC: 0.6 MG/DL (ref 0.1–2)
BUN BLD-MCNC: 8 MG/DL (ref 9–23)
CALCIUM BLD-MCNC: 9.5 MG/DL (ref 8.5–10.1)
CHLORIDE SERPL-SCNC: 103 MMOL/L (ref 98–112)
CHOLEST SERPL-MCNC: 165 MG/DL (ref ?–200)
CO2 SERPL-SCNC: 31 MMOL/L (ref 21–32)
CREAT BLD-MCNC: 0.76 MG/DL
EGFRCR SERPLBLD CKD-EPI 2021: 82 ML/MIN/1.73M2 (ref 60–?)
EOSINOPHIL # BLD AUTO: 0.11 X10(3) UL (ref 0–0.7)
EOSINOPHIL NFR BLD AUTO: 1.3 %
ERYTHROCYTE [DISTWIDTH] IN BLOOD BY AUTOMATED COUNT: 13.2 %
FASTING PATIENT LIPID ANSWER: YES
FASTING STATUS PATIENT QL REPORTED: YES
GLOBULIN PLAS-MCNC: 4.2 G/DL (ref 2.8–4.4)
GLUCOSE BLD-MCNC: 106 MG/DL (ref 70–99)
HCT VFR BLD AUTO: 39.8 %
HDLC SERPL-MCNC: 72 MG/DL (ref 40–59)
HGB BLD-MCNC: 12.9 G/DL
IMM GRANULOCYTES # BLD AUTO: 0.03 X10(3) UL (ref 0–1)
IMM GRANULOCYTES NFR BLD: 0.4 %
LDLC SERPL CALC-MCNC: 72 MG/DL (ref ?–100)
LYMPHOCYTES # BLD AUTO: 2.33 X10(3) UL (ref 1–4)
LYMPHOCYTES NFR BLD AUTO: 28 %
MCH RBC QN AUTO: 29.8 PG (ref 26–34)
MCHC RBC AUTO-ENTMCNC: 32.4 G/DL (ref 31–37)
MCV RBC AUTO: 91.9 FL
MONOCYTES # BLD AUTO: 0.77 X10(3) UL (ref 0.1–1)
MONOCYTES NFR BLD AUTO: 9.2 %
NEUTROPHILS # BLD AUTO: 5.02 X10 (3) UL (ref 1.5–7.7)
NEUTROPHILS # BLD AUTO: 5.02 X10(3) UL (ref 1.5–7.7)
NEUTROPHILS NFR BLD AUTO: 60.3 %
NONHDLC SERPL-MCNC: 93 MG/DL (ref ?–130)
OSMOLALITY SERPL CALC.SUM OF ELEC: 289 MOSM/KG (ref 275–295)
PLATELET # BLD AUTO: 272 10(3)UL (ref 150–450)
POTASSIUM SERPL-SCNC: 3.3 MMOL/L (ref 3.5–5.1)
PROT SERPL-MCNC: 7.6 G/DL (ref 6.4–8.2)
RBC # BLD AUTO: 4.33 X10(6)UL
SODIUM SERPL-SCNC: 140 MMOL/L (ref 136–145)
TRIGL SERPL-MCNC: 118 MG/DL (ref 30–149)
TSI SER-ACNC: 0.42 MIU/ML (ref 0.36–3.74)
VLDLC SERPL CALC-MCNC: 18 MG/DL (ref 0–30)
WBC # BLD AUTO: 8.3 X10(3) UL (ref 4–11)

## 2023-11-03 PROCEDURE — 85025 COMPLETE CBC W/AUTO DIFF WBC: CPT

## 2023-11-03 PROCEDURE — 36415 COLL VENOUS BLD VENIPUNCTURE: CPT

## 2023-11-03 PROCEDURE — 80053 COMPREHEN METABOLIC PANEL: CPT

## 2023-11-03 PROCEDURE — 80061 LIPID PANEL: CPT

## 2023-11-03 PROCEDURE — 84443 ASSAY THYROID STIM HORMONE: CPT

## 2023-11-14 ENCOUNTER — OFFICE VISIT (OUTPATIENT)
Dept: INTERNAL MEDICINE CLINIC | Facility: CLINIC | Age: 73
End: 2023-11-14
Payer: MEDICARE

## 2023-11-14 ENCOUNTER — TELEPHONE (OUTPATIENT)
Dept: INTERNAL MEDICINE CLINIC | Facility: CLINIC | Age: 73
End: 2023-11-14

## 2023-11-14 VITALS
OXYGEN SATURATION: 97 % | WEIGHT: 130.38 LBS | DIASTOLIC BLOOD PRESSURE: 80 MMHG | RESPIRATION RATE: 18 BRPM | TEMPERATURE: 98 F | HEART RATE: 66 BPM | BODY MASS INDEX: 23.69 KG/M2 | SYSTOLIC BLOOD PRESSURE: 139 MMHG | HEIGHT: 62.21 IN

## 2023-11-14 DIAGNOSIS — I10 ESSENTIAL HYPERTENSION: Primary | ICD-10-CM

## 2023-11-14 DIAGNOSIS — M54.2 POSTERIOR NECK PAIN: ICD-10-CM

## 2023-11-14 DIAGNOSIS — E87.6 HYPOKALEMIA: ICD-10-CM

## 2023-11-14 PROCEDURE — 3008F BODY MASS INDEX DOCD: CPT | Performed by: INTERNAL MEDICINE

## 2023-11-14 PROCEDURE — 3075F SYST BP GE 130 - 139MM HG: CPT | Performed by: INTERNAL MEDICINE

## 2023-11-14 PROCEDURE — 3079F DIAST BP 80-89 MM HG: CPT | Performed by: INTERNAL MEDICINE

## 2023-11-14 PROCEDURE — 1160F RVW MEDS BY RX/DR IN RCRD: CPT | Performed by: INTERNAL MEDICINE

## 2023-11-14 PROCEDURE — 99214 OFFICE O/P EST MOD 30 MIN: CPT | Performed by: INTERNAL MEDICINE

## 2023-11-14 PROCEDURE — 1159F MED LIST DOCD IN RCRD: CPT | Performed by: INTERNAL MEDICINE

## 2023-11-14 RX ORDER — METOPROLOL SUCCINATE 100 MG/1
100 TABLET, EXTENDED RELEASE ORAL 2 TIMES DAILY
Qty: 180 TABLET | Refills: 1 | Status: SHIPPED | OUTPATIENT
Start: 2023-11-14

## 2023-11-14 RX ORDER — TRIAMTERENE CAPSULES 50 MG/1
50 CAPSULE ORAL DAILY
Qty: 90 CAPSULE | Refills: 1 | Status: SHIPPED | OUTPATIENT
Start: 2023-11-14

## 2023-12-12 ENCOUNTER — LAB ENCOUNTER (OUTPATIENT)
Dept: LAB | Age: 73
End: 2023-12-12
Attending: INTERNAL MEDICINE
Payer: MEDICARE

## 2023-12-12 ENCOUNTER — OFFICE VISIT (OUTPATIENT)
Dept: INTERNAL MEDICINE CLINIC | Facility: CLINIC | Age: 73
End: 2023-12-12
Payer: MEDICARE

## 2023-12-12 ENCOUNTER — HOSPITAL ENCOUNTER (OUTPATIENT)
Dept: GENERAL RADIOLOGY | Age: 73
Discharge: HOME | End: 2023-12-12
Attending: INTERNAL MEDICINE
Payer: MEDICARE

## 2023-12-12 VITALS
DIASTOLIC BLOOD PRESSURE: 82 MMHG | WEIGHT: 131 LBS | SYSTOLIC BLOOD PRESSURE: 138 MMHG | OXYGEN SATURATION: 98 % | TEMPERATURE: 98 F | HEIGHT: 62 IN | RESPIRATION RATE: 16 BRPM | HEART RATE: 59 BPM | BODY MASS INDEX: 24.11 KG/M2

## 2023-12-12 DIAGNOSIS — E87.6 HYPOKALEMIA: ICD-10-CM

## 2023-12-12 DIAGNOSIS — M54.2 POSTERIOR NECK PAIN: ICD-10-CM

## 2023-12-12 DIAGNOSIS — I10 ESSENTIAL HYPERTENSION: ICD-10-CM

## 2023-12-12 DIAGNOSIS — I10 ESSENTIAL HYPERTENSION: Primary | ICD-10-CM

## 2023-12-12 DIAGNOSIS — R00.2 PALPITATIONS: ICD-10-CM

## 2023-12-12 LAB
ANION GAP SERPL CALC-SCNC: 4 MMOL/L (ref 0–18)
ATRIAL RATE: 57 BPM
BUN BLD-MCNC: 13 MG/DL (ref 9–23)
CALCIUM BLD-MCNC: 9.3 MG/DL (ref 8.5–10.1)
CHLORIDE SERPL-SCNC: 101 MMOL/L (ref 98–112)
CO2 SERPL-SCNC: 32 MMOL/L (ref 21–32)
CREAT BLD-MCNC: 0.87 MG/DL
EGFRCR SERPLBLD CKD-EPI 2021: 70 ML/MIN/1.73M2 (ref 60–?)
FASTING STATUS PATIENT QL REPORTED: NO
GLUCOSE BLD-MCNC: 96 MG/DL (ref 70–99)
OSMOLALITY SERPL CALC.SUM OF ELEC: 284 MOSM/KG (ref 275–295)
P AXIS: 27 DEGREES
P-R INTERVAL: 170 MS
POTASSIUM SERPL-SCNC: 3.6 MMOL/L (ref 3.5–5.1)
Q-T INTERVAL: 448 MS
QRS DURATION: 96 MS
QTC CALCULATION (BEZET): 436 MS
R AXIS: -14 DEGREES
SODIUM SERPL-SCNC: 137 MMOL/L (ref 136–145)
T AXIS: 11 DEGREES
VENTRICULAR RATE: 57 BPM

## 2023-12-12 PROCEDURE — 80048 BASIC METABOLIC PNL TOTAL CA: CPT

## 2023-12-12 PROCEDURE — 3079F DIAST BP 80-89 MM HG: CPT | Performed by: INTERNAL MEDICINE

## 2023-12-12 PROCEDURE — 3075F SYST BP GE 130 - 139MM HG: CPT | Performed by: INTERNAL MEDICINE

## 2023-12-12 PROCEDURE — 36415 COLL VENOUS BLD VENIPUNCTURE: CPT

## 2023-12-12 PROCEDURE — 3008F BODY MASS INDEX DOCD: CPT | Performed by: INTERNAL MEDICINE

## 2023-12-12 PROCEDURE — 99214 OFFICE O/P EST MOD 30 MIN: CPT | Performed by: INTERNAL MEDICINE

## 2023-12-12 PROCEDURE — 72050 X-RAY EXAM NECK SPINE 4/5VWS: CPT | Performed by: INTERNAL MEDICINE

## 2023-12-12 PROCEDURE — 93000 ELECTROCARDIOGRAM COMPLETE: CPT | Performed by: INTERNAL MEDICINE

## 2023-12-12 RX ORDER — SIMVASTATIN 40 MG
40 TABLET ORAL DAILY
Qty: 90 TABLET | Refills: 1 | Status: SHIPPED | OUTPATIENT
Start: 2023-12-12

## 2023-12-12 RX ORDER — TRIAMTERENE AND HYDROCHLOROTHIAZIDE 37.5; 25 MG/1; MG/1
1 TABLET ORAL DAILY
COMMUNITY
Start: 2023-12-12

## 2023-12-13 ENCOUNTER — TELEPHONE (OUTPATIENT)
Dept: INTERNAL MEDICINE CLINIC | Facility: CLINIC | Age: 73
End: 2023-12-13

## 2024-01-03 ENCOUNTER — HOSPITAL ENCOUNTER (OUTPATIENT)
Dept: CV DIAGNOSTICS | Age: 74
Discharge: HOME | End: 2024-01-03
Attending: INTERNAL MEDICINE
Payer: MEDICARE

## 2024-01-03 DIAGNOSIS — R00.2 PALPITATIONS: ICD-10-CM

## 2024-01-03 PROCEDURE — 93226 XTRNL ECG REC<48 HR SCAN A/R: CPT | Performed by: INTERNAL MEDICINE

## 2024-01-03 PROCEDURE — 93225 XTRNL ECG REC<48 HRS REC: CPT | Performed by: INTERNAL MEDICINE

## 2024-02-12 ENCOUNTER — OFFICE VISIT (OUTPATIENT)
Dept: INTERNAL MEDICINE CLINIC | Facility: CLINIC | Age: 74
End: 2024-02-12
Payer: MEDICARE

## 2024-02-12 VITALS
TEMPERATURE: 99 F | DIASTOLIC BLOOD PRESSURE: 82 MMHG | WEIGHT: 134 LBS | HEART RATE: 64 BPM | SYSTOLIC BLOOD PRESSURE: 142 MMHG | HEIGHT: 62.21 IN | BODY MASS INDEX: 24.35 KG/M2 | OXYGEN SATURATION: 97 % | RESPIRATION RATE: 16 BRPM

## 2024-02-12 DIAGNOSIS — F41.9 ANXIETY: ICD-10-CM

## 2024-02-12 DIAGNOSIS — R00.2 PALPITATIONS: Primary | ICD-10-CM

## 2024-02-12 DIAGNOSIS — I10 ESSENTIAL HYPERTENSION: ICD-10-CM

## 2024-02-12 PROCEDURE — 99214 OFFICE O/P EST MOD 30 MIN: CPT | Performed by: INTERNAL MEDICINE

## 2024-02-12 RX ORDER — LOSARTAN POTASSIUM AND HYDROCHLOROTHIAZIDE 25; 100 MG/1; MG/1
1 TABLET ORAL DAILY
Qty: 90 TABLET | Refills: 1 | Status: SHIPPED | OUTPATIENT
Start: 2024-02-12 | End: 2025-02-06

## 2024-02-12 RX ORDER — METOPROLOL SUCCINATE 100 MG/1
100 TABLET, EXTENDED RELEASE ORAL 2 TIMES DAILY
Qty: 180 TABLET | Refills: 1 | Status: SHIPPED | OUTPATIENT
Start: 2024-02-12

## 2024-02-12 RX ORDER — TRIAMTERENE AND HYDROCHLOROTHIAZIDE 37.5; 25 MG/1; MG/1
1 TABLET ORAL DAILY
Qty: 90 TABLET | Refills: 1 | Status: SHIPPED | OUTPATIENT
Start: 2024-02-12

## 2024-02-12 NOTE — PROGRESS NOTES
Arcelia Skinner is a 73 year old female.    Chief Complaint   Patient presents with    Follow - Up     ES rm - 3 - 2 mo f/u for HTN, labs, med refills       HPI:       Patient with HTN, HL, anxiety here for follow up. She ran out of triamterene hydrochlorothiazide few weeks ago, since then she notes slight swelling of lower legs. When she took triamterene combined with losartan hydrochlorothiazide and metoprolol, there was no leg swelling. Her BMP on the 2 diuretics was ok as well. She saw Dr. Todd (psychiatrist) since last OV with me. She reduced the venlafaxine dose to 150mg daily from 225mg daily. Patient's anxiety is doing ok, she occasionally uses clonazepm prn but most days only the venlafaxine. Her palpitations come and go, Holter monitor was unremarkable. No CP/SOB.       Patient Active Problem List   Diagnosis    Allergic rhinitis    Essential hypertension    GERD (gastroesophageal reflux disease)    Pure hypercholesterolemia    History of depression    Hiatal hernia    Current moderate episode of major depressive disorder without prior episode ([facility])    Acute pain of left knee    Anxiety    Palpitations    Posterior neck pain     Current Outpatient Medications   Medication Sig Dispense Refill    metoprolol succinate  MG Oral Tablet 24 Hr Take 1 tablet (100 mg total) by mouth 2 (two) times daily. 180 tablet 1    losartan-hydroCHLOROthiazide 100-25 MG Oral Tab Take 1 tablet by mouth daily. 90 tablet 1    Triamterene-HCTZ 37.5-25 MG Oral Tab Take 1 tablet by mouth daily. 90 tablet 1    simvastatin 40 MG Oral Tab Take 1 tablet (40 mg total) by mouth daily. 90 tablet 1    Omeprazole 40 MG Oral Capsule Delayed Release Take 1 capsule (40 mg total) by mouth daily for 360 doses. 1/2 hour prior to breakfast. 90 capsule 3    Clobetasol Propionate 0.05 % External Ointment apply nightly to affected area x1 week, then every other night x1 week, then twice a week x1 week. 60 g 0    Capsaicin 0.075 % External  Cream Apply 2-3x per day 60 g 2    triamcinolone acetonide 0.1 % External Cream       clonazePAM 0.25 MG Oral Tablet Dispersible Take 1 tablet (0.25 mg total) by mouth as needed.      venlafaxine  MG Oral Capsule SR 24 Hr Take 1 capsule (150 mg total) by mouth daily. 180 capsule 0    venlafaxine ER 75 MG Oral Capsule SR 24 Hr Take 1 capsule (75 mg total) by mouth daily. 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    Hearing loss     High blood pressure     High cholesterol     History of depression     Insomnia     Meniere disease     MENOPAUSE     Painful swallowing     Visual impairment     glasses,contacts    Wears glasses       Social History:  Social History     Socioeconomic History    Marital status:    Tobacco Use    Smoking status: Never     Passive exposure: Never    Smokeless tobacco: Never   Vaping Use    Vaping Use: Never used   Substance and Sexual Activity    Alcohol use: Yes     Alcohol/week: 3.0 standard drinks of alcohol     Types: 3 Glasses of wine per week     Comment: weekly    Drug use: No    Sexual activity: Not Currently     Family History   Problem Relation Age of Onset    Cancer Mother     Breast Cancer Mother 81        80s    Cancer Brother         pancreatic        Allergies  No Known Allergies      REVIEW OF SYSTEMS:   GENERAL HEALTH:  no fevers   RESPIRATORY: no cough  CARDIOVASCULAR: denies chest pain +palpitations  GI: denies abdominal pain  : no dysuria  NEURO: denies headaches  PSYCH: No reported depression +anxiety  HEME: No adenopathy      EXAM:   /82   Pulse 64   Temp 98.6 °F (37 °C) (Temporal)   Resp 16   Ht 5' 2.21\" (1.58 m)   Wt 134 lb (60.8 kg)   LMP  (LMP Unknown)   SpO2 97%   BMI 24.35 kg/m²   GENERAL: well developed, well  nourished,in no apparent distress  HEENT: atraumatic, normocephalic  NECK: supple,no adenopathy  LUNGS: normal rate without respiratory distress, lungs clear to auscultation  CARDIO: RRR nl S1 S2  GI: normal bowel sounds, soft, NT/ND  EXTREMITIES: no cyanosis, clubbing. Trace edema b/l LE  NEURO: Alert and oriented    ASSESSMENT AND PLAN:     Encounter Diagnoses   Name     Essential hypertension- not controlled without the triamterene hydrochlorothiazide (she ran out few weeks ago), will restart it and continue other 2 medications the same. BMP on 2 diuretics wnl on 12/12/23. She gets too anxious monitoring BP at home so will check in office q3 months     Palpitations- holter monitor results reviewed with her, overall benign. Advised to avoid caffeine. If symptoms persist, she can see cardiologist for opinion. Referral given     Anxiety- controlled, she is on reduced venlafaxine dose of 150mg daily and clonazepam prn per psychiatry        No orders of the defined types were placed in this encounter.      Meds & Refills for this Visit:  Requested Prescriptions     Signed Prescriptions Disp Refills    metoprolol succinate  MG Oral Tablet 24 Hr 180 tablet 1     Sig: Take 1 tablet (100 mg total) by mouth 2 (two) times daily.    losartan-hydroCHLOROthiazide 100-25 MG Oral Tab 90 tablet 1     Sig: Take 1 tablet by mouth daily.    Triamterene-HCTZ 37.5-25 MG Oral Tab 90 tablet 1     Sig: Take 1 tablet by mouth daily.       Imaging & Consults:  CARDIO - INTERNAL    Return in about 3 months (around 5/12/2024), or if symptoms worsen or fail to improve, for annual.  There are no Patient Instructions on file for this visit.      The patient indicates understanding of these issues and agrees to the plan.

## 2024-05-14 ENCOUNTER — OFFICE VISIT (OUTPATIENT)
Dept: INTERNAL MEDICINE CLINIC | Facility: CLINIC | Age: 74
End: 2024-05-14

## 2024-05-14 VITALS
BODY MASS INDEX: 23.8 KG/M2 | HEIGHT: 62.21 IN | DIASTOLIC BLOOD PRESSURE: 84 MMHG | WEIGHT: 131 LBS | SYSTOLIC BLOOD PRESSURE: 136 MMHG | TEMPERATURE: 97 F | OXYGEN SATURATION: 98 % | RESPIRATION RATE: 18 BRPM | HEART RATE: 50 BPM

## 2024-05-14 DIAGNOSIS — E78.00 PURE HYPERCHOLESTEROLEMIA: ICD-10-CM

## 2024-05-14 DIAGNOSIS — I10 ESSENTIAL HYPERTENSION: Primary | ICD-10-CM

## 2024-05-14 DIAGNOSIS — K21.9 GASTROESOPHAGEAL REFLUX DISEASE, UNSPECIFIED WHETHER ESOPHAGITIS PRESENT: ICD-10-CM

## 2024-05-14 PROCEDURE — 99213 OFFICE O/P EST LOW 20 MIN: CPT | Performed by: INTERNAL MEDICINE

## 2024-05-14 NOTE — PROGRESS NOTES
Arcelia Skinner is a 73 year old female.    Chief Complaint   Patient presents with    Blood Pressure     EJ RM 4- Pt is here for a 3 mos bp f/u       HPI:     Pleasant patient with HTN, HL, anxiety here for follow up.  She feels well, stopped checking BP at home because all over the place and she has anxiety to begin with.  Taking all 3 of her medications as prescribed, no HA/CP. Palpitations much better. BP checked few times today 130s/80s. Swelling in her legs is completely resolved.  She is due for wellness and pt can RTC in Aug. Will do labs prior to wellness.    Patient Active Problem List   Diagnosis    Allergic rhinitis    Essential hypertension    GERD (gastroesophageal reflux disease)    Pure hypercholesterolemia    History of depression    Hiatal hernia    Current moderate episode of major depressive disorder without prior episode (HCC)    Acute pain of left knee    Anxiety    Palpitations    Posterior neck pain     Current Outpatient Medications   Medication Sig Dispense Refill    metoprolol succinate  MG Oral Tablet 24 Hr Take 1 tablet (100 mg total) by mouth 2 (two) times daily. 180 tablet 1    losartan-hydroCHLOROthiazide 100-25 MG Oral Tab Take 1 tablet by mouth daily. 90 tablet 1    Triamterene-HCTZ 37.5-25 MG Oral Tab Take 1 tablet by mouth daily. 90 tablet 1    simvastatin 40 MG Oral Tab Take 1 tablet (40 mg total) by mouth daily. 90 tablet 1    Omeprazole 40 MG Oral Capsule Delayed Release Take 1 capsule (40 mg total) by mouth daily for 360 doses. 1/2 hour prior to breakfast. 90 capsule 3    venlafaxine ER 75 MG Oral Capsule SR 24 Hr Take 1 capsule (75 mg total) by mouth daily.      Clobetasol Propionate 0.05 % External Ointment apply nightly to affected area x1 week, then every other night x1 week, then twice a week x1 week. 60 g 0    Capsaicin 0.075 % External Cream Apply 2-3x per day 60 g 2    triamcinolone acetonide 0.1 % External Cream       clonazePAM 0.25 MG Oral Tablet Dispersible  Take 1 tablet (0.25 mg total) by mouth as needed.      venlafaxine  MG Oral Capsule SR 24 Hr Take 1 capsule (150 mg total) by mouth daily. 180 capsule 0      Past Medical History:    Abdominal hernia    Anxiety state    Arthritis    Bloating    Depression    Diarrhea, unspecified    Esophageal reflux    Hearing impairment    [employer] [place] left ear - aid used    Hearing loss    High blood pressure    High cholesterol    History of depression    Insomnia    Meniere disease    MENOPAUSE    Painful swallowing    Visual impairment    glasses,contacts    Wears glasses      Social History:  Social History     Socioeconomic History    Marital status:    Tobacco Use    Smoking status: Never     Passive exposure: Never    Smokeless tobacco: Never   Vaping Use    Vaping status: Never Used   Substance and Sexual Activity    Alcohol use: Yes     Alcohol/week: 3.0 standard drinks of alcohol     Types: 3 Glasses of wine per week     Comment: weekly    Drug use: No    Sexual activity: Not Currently     Social Determinants of Health      [facility]     Family History   Problem Relation Age of Onset    Cancer Mother     Breast Cancer Mother 81        80s    Cancer Brother         pancreatic        Allergies  No Known Allergies      REVIEW OF SYSTEMS:   GENERAL HEALTH:  no fevers   RESPIRATORY: no cough  CARDIOVASCULAR: denies chest pain  GI: denies abdominal pain, gerd controlled on omeprazole  : no dysuria  NEURO: denies headaches  PSYCH: No reported depression   HEME: No adenopathy      EXAM:   /84   Pulse 50   Temp 96.5 °F (35.8 °C) (Temporal)   Resp 18   Ht 5' 2.21\" (1.58 m)   Wt 131 lb (59.4 kg)   LMP  (LMP Unknown)   SpO2 98%   BMI 23.80 kg/m²   GENERAL: well developed, well nourished,in no apparent distress  LUNGS: normal rate without respiratory distress, lungs clear to auscultation  CARDIO: RRR nl S1 S2  GI: normal bowel sounds, soft, NT/ND  EXTREMITIES: no  cyanosis, clubbing or edema  NEURO: Alert and oriented    ASSESSMENT AND PLAN:     Encounter Diagnoses   Name     Pure hypercholesterolemia- continue simvastatin, check labs      Essential hypertension- controlled, CPM     Gastroesophageal reflux disease, unspecified whether esophagitis present- controlled on omeprazole        Orders Placed This Encounter   Procedures    Lipid Panel [E]    CBC W Differential W Platelet [E]    TSH W Reflex To Free T4 [E]    Comp Metabolic Panel (14)       Meds & Refills for this Visit:  Requested Prescriptions      No prescriptions requested or ordered in this encounter       Imaging & Consults:  None    Return in 14 weeks (on 8/20/2024), or if symptoms worsen or fail to improve, for wellness.  There are no Patient Instructions on file for this visit.      The patient indicates understanding of these issues and agrees to the plan.

## 2024-06-14 RX ORDER — SIMVASTATIN 40 MG
40 TABLET ORAL DAILY
Qty: 90 TABLET | Refills: 3 | Status: SHIPPED | OUTPATIENT
Start: 2024-06-14

## 2024-06-14 NOTE — TELEPHONE ENCOUNTER
Refill passed per [facility] protocol.    Requested Prescriptions   Pending Prescriptions Disp Refills    SIMVASTATIN 40 MG Oral Tab [Pharmacy Med Name: Simvastatin 40 Mg Tab Nort] 90 tablet 0     Sig: Take 1 tablet (40 mg total) by mouth daily.       Cholesterol Medication Protocol Passed - 6/12/2024  1:32 AM        Passed - ALT < 80     Lab Results   Component Value Date    ALT 22 11/03/2023             Passed - ALT resulted within past year        Passed - Lipid panel within past 12 months     Lab Results   Component Value Date    CHOLEST 165 11/03/2023    TRIG 118 11/03/2023    HDL 72 (H) 11/03/2023    LDL 72 11/03/2023    VLDL 18 11/03/2023    NONHDLC 93 11/03/2023             Passed - In person appointment or virtual visit in the past 12 mos or appointment in next 3 mos     Recent Outpatient Visits              1 month ago Essential hypertension    [facility], [address] [place], [place] [address]Shani Nelson MD    Office Visit    4 months ago Palpitations    [address] Shani Mccabe MD    Office Visit    6 months ago Essential hypertension    [address]    Office Visit    7 months ago Essential hypertension    [address]    Office Visit    7 months ago Essential hypertension    [address] Shani Mccabe MD    Office Visit          Future Appointments         Provider Department Appt Notes    In 2 months Shani Suarez MD [address] Supervisit-[id number] last one unknown                         Future Appointments         Provider Department Appt Notes    In 2 months Shani Suarez MD [address] Supervisit-[id number] last one unknown            Recent Outpatient Visits              1 month ago Essential  hypertension    [facility], [address] [place] [address], Shani Mccabe MD    Office Visit    4 months ago Palpitations    [facility], [address] [place] [address], Shani Mccabe MD    Office Visit    6 months ago Essential hypertension    [facility], [address] [place] [address], Shani Mccabe MD    Office Visit    7 months ago Essential hypertension    [facility], [address] [place] [address], Shani Mccabe MD    Office Visit    7 months ago Essential hypertension    [facility], [address] [place] [address], Shani Mccabe MD    Office Visit

## 2024-08-05 DIAGNOSIS — I10 ESSENTIAL HYPERTENSION: ICD-10-CM

## 2024-08-08 RX ORDER — METOPROLOL SUCCINATE 100 MG/1
100 TABLET, EXTENDED RELEASE ORAL 2 TIMES DAILY
Qty: 180 TABLET | Refills: 3 | Status: SHIPPED | OUTPATIENT
Start: 2024-08-08

## 2024-08-08 RX ORDER — TRIAMTERENE AND HYDROCHLOROTHIAZIDE 37.5; 25 MG/1; MG/1
1 TABLET ORAL DAILY
Qty: 90 TABLET | Refills: 3 | Status: SHIPPED | OUTPATIENT
Start: 2024-08-08

## 2024-08-08 NOTE — TELEPHONE ENCOUNTER
Refill passed per [facility] protocol.  Requested Prescriptions   Pending Prescriptions Disp Refills    METOPROLOL SUCCINATE  MG Oral Tablet 24 Hr [Pharmacy Med Name: Metoprolol Succinate Er 24hr 100 Mg Tab Nort] 180 tablet 0     Sig: Take 1 tablet (100 mg total) by mouth 2 (two) times daily.       Hypertension Medications Protocol Passed - 8/5/2024  3:02 AM        Passed - CMP or BMP in past 12 months        Passed - Last BP reading less than 140/90     BP Readings from Last 1 Encounters:   05/14/24 136/84               Passed - In person appointment or virtual visit in the past 12 mos or appointment in next 3 mos     Recent Outpatient Visits              2 months ago Essential hypertension    [address] Shani Suarez MD    Office Visit    5 months ago Palpitations    [address] Shani Mccabe MD    Office Visit    8 months ago Essential hypertension    [address] Shani Suarez MD    Office Visit    8 months ago Essential hypertension    [address] Shani Suarez MD    Office Visit    9 months ago Essential hypertension    [address] Shani Suarez MD    Office Visit          Future Appointments         Provider Department Appt Notes    In 2 weeks Shani Suarez MD [address] Supervisit-[id number] last one unknown    In 4 months Shani Suarez MD [address] 4 mo fu                    Passed - EGFRCR or GFRNAA > 50     GFR Evaluation  EGFRCR: 70 , resulted on 12/12/2023            TRIAMTERENE-HCTZ 37.5-25 MG Oral Tab [Pharmacy Med Name: Triamterene/Hydrochlorothiazide 37.5-25 Mg Tab Zydu] 90 tablet 0     Sig: Take 1 tablet by mouth daily.       Hypertension Medications Protocol Passed - 8/5/2024   3:02 AM        Passed - CMP or BMP in past 12 months        Passed - Last BP reading less than 140/90     BP Readings from Last 1 Encounters:   05/14/24 136/84               Passed - In person appointment or virtual visit in the past 12 mos or appointment in next 3 mos     Recent Outpatient Visits              2 months ago Essential hypertension    [facility], [address] [place], [place] [address]Shani Coulter MD    Office Visit    5 months ago Palpitations    [address]    Office Visit    8 months ago Essential hypertension    [address]    Office Visit    8 months ago Essential hypertension    [address]    Office Visit    9 months ago Essential hypertension    [address] Shani Mccabe MD    Office Visit          Future Appointments         Provider Department Appt Notes    In 2 weeks Shani Suarez MD [address] Supervisit-[id number] last one unknown    In 4 months Shani Suarez MD [address] 4 mo fu                    Passed - EGFRCR or GFRNAA > 50     GFR Evaluation  EGFRCR: 70 , resulted on 12/12/2023             Recent Outpatient Visits              2 months ago Essential hypertension    [address] Shani Mccabe MD    Office Visit    5 months ago Palpitations    [address] Shani Mccabe MD    Office Visit    8 months ago Essential hypertension    [address]    Office Visit    8 months ago Essential hypertension    [address]    Office Visit    9 months ago Essential  hypertension    [facility], [address] [place] [address] Shani Suarez MD    Office Visit          Future Appointments         Provider Department Appt Notes    In 2 weeks Shani Suarez MD [address] Supervisit-[id number] last one unknown    In 4 months Shani Suarez MD [address] 4 mo fu

## 2024-08-22 ENCOUNTER — LAB ENCOUNTER (OUTPATIENT)
Dept: LAB | Age: 74
End: 2024-08-22
Attending: INTERNAL MEDICINE
Payer: MEDICARE

## 2024-08-22 DIAGNOSIS — I10 ESSENTIAL HYPERTENSION: ICD-10-CM

## 2024-08-22 DIAGNOSIS — E78.00 PURE HYPERCHOLESTEROLEMIA: ICD-10-CM

## 2024-08-22 DIAGNOSIS — K21.9 GASTROESOPHAGEAL REFLUX DISEASE, UNSPECIFIED WHETHER ESOPHAGITIS PRESENT: ICD-10-CM

## 2024-08-22 LAB
ALBUMIN SERPL-MCNC: 4.5 G/DL (ref 3.2–4.8)
ALBUMIN/GLOB SERPL: 1.7 {RATIO} (ref 1–2)
ALP LIVER SERPL-CCNC: 99 U/L
ALT SERPL-CCNC: 15 U/L
ANION GAP SERPL CALC-SCNC: 6 MMOL/L (ref 0–18)
AST SERPL-CCNC: 19 U/L (ref ?–34)
BASOPHILS # BLD AUTO: 0.06 X10(3) UL (ref 0–0.2)
BASOPHILS NFR BLD AUTO: 0.9 %
BILIRUB SERPL-MCNC: 0.5 MG/DL (ref 0.2–1.1)
BUN BLD-MCNC: 11 MG/DL (ref 9–23)
CALCIUM BLD-MCNC: 9.9 MG/DL (ref 8.7–10.4)
CHLORIDE SERPL-SCNC: 103 MMOL/L (ref 98–112)
CHOLEST SERPL-MCNC: 192 MG/DL (ref ?–200)
CO2 SERPL-SCNC: 29 MMOL/L (ref 21–32)
CREAT BLD-MCNC: 0.68 MG/DL
EGFRCR SERPLBLD CKD-EPI 2021: 91 ML/MIN/1.73M2 (ref 60–?)
EOSINOPHIL # BLD AUTO: 0.2 X10(3) UL (ref 0–0.7)
EOSINOPHIL NFR BLD AUTO: 2.9 %
ERYTHROCYTE [DISTWIDTH] IN BLOOD BY AUTOMATED COUNT: 13.6 %
FASTING PATIENT LIPID ANSWER: YES
FASTING STATUS PATIENT QL REPORTED: YES
GLOBULIN PLAS-MCNC: 2.7 G/DL (ref 2–3.5)
GLUCOSE BLD-MCNC: 105 MG/DL (ref 70–99)
HCT VFR BLD AUTO: 39.8 %
HDLC SERPL-MCNC: 66 MG/DL (ref 40–59)
HGB BLD-MCNC: 13.2 G/DL
IMM GRANULOCYTES # BLD AUTO: 0.01 X10(3) UL (ref 0–1)
IMM GRANULOCYTES NFR BLD: 0.1 %
LDLC SERPL CALC-MCNC: 92 MG/DL (ref ?–100)
LYMPHOCYTES # BLD AUTO: 2.63 X10(3) UL (ref 1–4)
LYMPHOCYTES NFR BLD AUTO: 37.6 %
MCH RBC QN AUTO: 30.1 PG (ref 26–34)
MCHC RBC AUTO-ENTMCNC: 33.2 G/DL (ref 31–37)
MCV RBC AUTO: 90.7 FL
MONOCYTES # BLD AUTO: 0.69 X10(3) UL (ref 0.1–1)
MONOCYTES NFR BLD AUTO: 9.9 %
NEUTROPHILS # BLD AUTO: 3.4 X10 (3) UL (ref 1.5–7.7)
NEUTROPHILS # BLD AUTO: 3.4 X10(3) UL (ref 1.5–7.7)
NEUTROPHILS NFR BLD AUTO: 48.6 %
NONHDLC SERPL-MCNC: 126 MG/DL (ref ?–130)
OSMOLALITY SERPL CALC.SUM OF ELEC: 286 MOSM/KG (ref 275–295)
PLATELET # BLD AUTO: 292 10(3)UL (ref 150–450)
POTASSIUM SERPL-SCNC: 3.3 MMOL/L (ref 3.5–5.1)
PROT SERPL-MCNC: 7.2 G/DL (ref 5.7–8.2)
RBC # BLD AUTO: 4.39 X10(6)UL
SODIUM SERPL-SCNC: 138 MMOL/L (ref 136–145)
TRIGL SERPL-MCNC: 206 MG/DL (ref 30–149)
TSI SER-ACNC: 1.33 MIU/ML (ref 0.55–4.78)
VLDLC SERPL CALC-MCNC: 34 MG/DL (ref 0–30)
WBC # BLD AUTO: 7 X10(3) UL (ref 4–11)

## 2024-08-22 PROCEDURE — 80053 COMPREHEN METABOLIC PANEL: CPT

## 2024-08-22 PROCEDURE — 36415 COLL VENOUS BLD VENIPUNCTURE: CPT

## 2024-08-22 PROCEDURE — 85025 COMPLETE CBC W/AUTO DIFF WBC: CPT

## 2024-08-22 PROCEDURE — 80061 LIPID PANEL: CPT

## 2024-08-22 PROCEDURE — 84443 ASSAY THYROID STIM HORMONE: CPT

## 2024-08-27 ENCOUNTER — OFFICE VISIT (OUTPATIENT)
Dept: INTERNAL MEDICINE CLINIC | Facility: CLINIC | Age: 74
End: 2024-08-27
Payer: MEDICARE

## 2024-08-27 VITALS
SYSTOLIC BLOOD PRESSURE: 110 MMHG | WEIGHT: 130.63 LBS | BODY MASS INDEX: 23.73 KG/M2 | DIASTOLIC BLOOD PRESSURE: 70 MMHG | TEMPERATURE: 97 F | HEIGHT: 62.21 IN | OXYGEN SATURATION: 96 % | HEART RATE: 60 BPM

## 2024-08-27 DIAGNOSIS — F32.1 CURRENT MODERATE EPISODE OF MAJOR DEPRESSIVE DISORDER WITHOUT PRIOR EPISODE (HCC): ICD-10-CM

## 2024-08-27 DIAGNOSIS — K21.9 GASTROESOPHAGEAL REFLUX DISEASE, UNSPECIFIED WHETHER ESOPHAGITIS PRESENT: ICD-10-CM

## 2024-08-27 DIAGNOSIS — Z00.00 ENCOUNTER FOR MEDICARE ANNUAL WELLNESS EXAM: Primary | ICD-10-CM

## 2024-08-27 DIAGNOSIS — E78.00 PURE HYPERCHOLESTEROLEMIA: ICD-10-CM

## 2024-08-27 DIAGNOSIS — Z12.31 ENCOUNTER FOR SCREENING MAMMOGRAM FOR MALIGNANT NEOPLASM OF BREAST: ICD-10-CM

## 2024-08-27 DIAGNOSIS — E87.6 HYPOKALEMIA: ICD-10-CM

## 2024-08-27 DIAGNOSIS — Z78.0 POST-MENOPAUSAL: ICD-10-CM

## 2024-08-27 DIAGNOSIS — I10 ESSENTIAL HYPERTENSION: ICD-10-CM

## 2024-08-27 PROCEDURE — G0439 PPPS, SUBSEQ VISIT: HCPCS | Performed by: INTERNAL MEDICINE

## 2024-08-27 PROCEDURE — 3078F DIAST BP <80 MM HG: CPT | Performed by: INTERNAL MEDICINE

## 2024-08-27 PROCEDURE — 96160 PT-FOCUSED HLTH RISK ASSMT: CPT | Performed by: INTERNAL MEDICINE

## 2024-08-27 PROCEDURE — 99214 OFFICE O/P EST MOD 30 MIN: CPT | Performed by: INTERNAL MEDICINE

## 2024-08-27 PROCEDURE — 3074F SYST BP LT 130 MM HG: CPT | Performed by: INTERNAL MEDICINE

## 2024-08-27 PROCEDURE — 3008F BODY MASS INDEX DOCD: CPT | Performed by: INTERNAL MEDICINE

## 2024-08-27 RX ORDER — OMEPRAZOLE 40 MG/1
40 CAPSULE, DELAYED RELEASE ORAL DAILY
COMMUNITY
Start: 2024-06-12

## 2024-08-27 RX ORDER — POTASSIUM CHLORIDE 750 MG/1
10 TABLET, EXTENDED RELEASE ORAL DAILY
COMMUNITY
Start: 2024-08-22

## 2024-08-27 NOTE — PROGRESS NOTES
Subjective:   Arcelia Skinner is a 74 year old female who presents for a MA AHA (Medicare Advantage Annual Health Assessment) and Medicare Subsequent Annual Wellness visit (Pt already had Initial Annual Wellness) and scheduled follow up of multiple significant but stable problems.   1. Encounter for Medicare annual wellness exam (Primary)- referred for mammogram and dexa, she is up to date on colonoscopy. Encouraged shingrix, covid 19 booster and flu vaccine  2. Pure hypercholesterolemia- compliant with simvastatin, no cardiac complaints  3. Gastroesophageal reflux disease, unspecified whether esophagitis present- controlled, CPM  4. Essential hypertension- controlled with medication, no HA/CP/LH/DZ  5. Current moderate episode of major depressive disorder without prior episode (HCC)- doing well, she follows with psychiatry  6. Encounter for screening mammogram for malignant neoplasm of breast  -     [facility] JOANA 2D+3D SCREENING BILAT (CPT=77067/32009); Future; Expected date: 08/27/2024  7. Hypokalemia- likely from hydrochlorothiazide, patient says she will try to push high K foods. No symptoms of hypokalemia reported  -     Basic Metabolic Panel (8); Future; Expected date: 08/27/2024    History/Other:   Fall Risk Assessment:   She has been screened for Falls and is low risk.      Cognitive Assessment:   She had a completely normal cognitive assessment - see flowsheet entries     Functional Ability/Status:   Arcelia Skinner has a completely normal functional assessment. See flowsheet for details.      Depression Screening (PHQ):  PHQ-2 SCORE: 0  , done 8/27/2024   Last [place] Suicide Screening on 8/27/2024 was No Risk.         Advanced Directives:   She does have a Living Will but we do NOT have it on file in Epic.    She does have a POA but we do NOT have it on file in Epic.    Not discussed      Patient Active Problem List   Diagnosis    Allergic rhinitis    Essential hypertension    GERD (gastroesophageal reflux  disease)    Pure hypercholesterolemia    History of depression    Hiatal hernia    Current moderate episode of major depressive disorder without prior episode (HCC)    Acute pain of left knee    Anxiety    Palpitations    Posterior neck pain     Allergies:  She has No Known Allergies.    Current Medications:  Outpatient Medications Marked as Taking for the 8/27/24 encounter (Office Visit) with Shani Suarez MD   Medication Sig    Omeprazole 40 MG Oral Capsule Delayed Release Take 1 capsule (40 mg total) by mouth daily.    Potassium Chloride ER 10 MEQ Oral Tab CR Take 1 tablet (10 mEq total) by mouth daily.    metoprolol succinate  MG Oral Tablet 24 Hr Take 1 tablet (100 mg total) by mouth 2 (two) times daily.    Triamterene-HCTZ 37.5-25 MG Oral Tab Take 1 tablet by mouth daily.    simvastatin 40 MG Oral Tab Take 1 tablet (40 mg total) by mouth daily.    losartan-hydroCHLOROthiazide 100-25 MG Oral Tab Take 1 tablet by mouth daily.    Clobetasol Propionate 0.05 % External Ointment apply nightly to affected area x1 week, then every other night x1 week, then twice a week x1 week.    Capsaicin 0.075 % External Cream Apply 2-3x per day    triamcinolone acetonide 0.1 % External Cream     clonazePAM 0.25 MG Oral Tablet Dispersible Take 1 tablet (0.25 mg total) by mouth as needed.    venlafaxine  MG Oral Capsule SR 24 Hr Take 1 capsule (150 mg total) by mouth daily.       Medical History:  She  has a past medical history of Abdominal hernia, Anxiety state, Arthritis, Bloating, Depression, Diarrhea, unspecified, Esophageal reflux, Hearing impairment, Hearing loss, High blood pressure, High cholesterol, History of depression, Insomnia, Meniere disease, MENOPAUSE, Painful swallowing, Visual impairment, and Wears glasses.  Surgical History:  She  has a past surgical history that includes hysterectomy (08/2000); other surgical history (04/2011); arthroscopy of joint unlisted; cataract (Left, 03/2019); correct  bunion,othr methods (Left); other (2016); Eye surgery (Left, 06/2018); colonoscopy (07/2010); upper gi endoscopy,exam (7/2012, 12/2017); colonoscopy (N/A, 05/19/2022); and hernia surgery.   Family History:  Her family history includes Breast Cancer (age of onset: 81) in her mother; Cancer in her brother and mother.  Social History:  She  reports that she has never smoked. She has never been exposed to tobacco smoke. She has never used smokeless tobacco. She reports current alcohol use of about 3.0 standard drinks of alcohol per week. She reports that she does not use drugs.    Tobacco:  She has never smoked tobacco.    CAGE Alcohol Screen:   CAGE screening score of 0 on 8/27/2024, showing low risk of alcohol abuse.      Patient Care Team:  Shani Suarez MD as PCP - General (Internal Medicine)  Kael Alexander MD (GASTROENTEROLOGY)  Arlen Bai APRN (Nurse Practitioner)    Review of Systems     Negative for f/c/CP/palp/SOB    Objective:   Physical Exam  General Appearance:  Alert, cooperative, no distress, appears stated age   Head:  Normocephalic, without obvious abnormality, atraumatic   Eyes:  PERRL, conjunctiva/corneas clear, EOM's intact both eyes   Ears:  Normal TM's and external ear canals, both ears   Nose: Nares normal, septum midline,mucosa normal, no drainage or sinus tenderness   Throat: Lips, mucosa, and tongue normal; teeth and gums normal   Neck: Supple, symmetrical, trachea midline, no adenopathy; no TM   Back:   No TTP   Lungs:   Clear to auscultation bilaterally, respirations unlabored   Heart:  Regular rate and rhythm, S1 and S2 normal   Abdomen:   Soft, non-tender, ND   Pelvic: Deferred   Extremities: Extremities normal, atraumatic, no cyanosis or edema   Pulses: 2+ and symmetric   Skin: Skin color, texture, turgor normal, no rashes or lesions   Lymph nodes: Cervical, supraclavicular, and axillary nodes normal   Neurologic: Alert and oriented       /70   Pulse 60   Temp 97.1 °F  (36.2 °C) (Temporal)   Ht 5' 2.21\" (1.58 m)   Wt 130 lb 9.6 oz (59.2 kg)   LMP  (LMP Unknown)   SpO2 96%   BMI 23.73 kg/m²  Estimated body mass index is 23.73 kg/m² as calculated from the following:    Height as of this encounter: 5' 2.21\" (1.58 m).    Weight as of this encounter: 130 lb 9.6 oz (59.2 kg).    Medicare Hearing Assessment:   Hearing Screening    Time taken: 8/27/2024 11:03 AM  Entry User: Lanette Elliott MA  Screening Method: Finger Rub  Finger Rub Result: Pass         Visual Acuity:   Right Eye Visual Acuity: Uncorrected Right Eye Chart Acuity: 20/25   Left Eye Visual Acuity: Uncorrected Left Eye Chart Acuity: 20/30   Both Eyes Visual Acuity: Uncorrected Both Eyes Chart Acuity: 20/30   Able To Tolerate Visual Acuity: Yes        Assessment & Plan:   Arcelia Skinner is a 74 year old female who presents for a Medicare Assessment.     1. Encounter for Medicare annual wellness exam (Primary)- referred for mammogram and dexa, she is up to date on colonoscopy. Encouraged shingrix, covid 19 booster and flu vaccine  2. Pure hypercholesterolemia- continue simvastatin and heart healthy diet  3. Gastroesophageal reflux disease, unspecified whether esophagitis present- controlled, CPM  4. Essential hypertension- controlled, CPM  5. Current moderate episode of major depressive disorder without prior episode (HCC)- doing well, she follows with psychiatry  6. Encounter for screening mammogram for malignant neoplasm of breast  -     [facility] JOANA 2D+3D SCREENING BILAT (CPT=77067/29630); Future; Expected date: 08/27/2024  7. Hypokalemia- likely from hydrochlorothiazide, she will push high K foods and repeat BMP in 1 month. If again low, will start patient on daily K supplement  -     Basic Metabolic Panel (8); Future; Expected date: 08/27/2024    The patient indicates understanding of these issues and agrees to the plan.  Continue with current treatment plan.  Reinforced healthy diet, lifestyle, and  exercise.      Return in about 27 weeks (around 3/4/2025), or if symptoms worsen or fail to improve, for chronic issues.     Shani Suarez MD, 8/27/2024     Supplementary Documentation:   General Health:  In the past six months, have you lost more than 10 pounds without trying?: 2 - No  Has your appetite been poor?: No  Type of Diet: Balanced;Vegetarian  How does the patient maintain a good energy level?: Daily Walks  How would you describe your daily physical activity?: Light  How would you describe your current health state?: Good  How do you maintain positive mental well-being?: Social Interaction;Games;Visiting Family  On a scale of 0 to 10, with 0 being no pain and 10 being severe pain, what is your pain level?: 0 - (None)  In the past six months, have you experienced urine leakage?: 0-No  At any time do you feel concerned for the safety/well-being of yourself and/or your children, in your home or elsewhere?: No  Have you had any immunizations at another office such as Influenza, Hepatitis B, Tetanus, or Pneumococcal?: No    Health Maintenance   Topic Date Due    Zoster Vaccines (1 of 2) Never done    COVID-19 Vaccine (5 - 2023-24 season) 09/01/2023    MA Annual Health Assessment  01/01/2024    Influenza Vaccine (1) 10/01/2024    Colorectal Cancer Screening  05/19/2032    DEXA Scan  Completed    Annual Depression Screening  Completed    Fall Risk Screening (Annual)  Completed    Pneumococcal Vaccine: 65+ Years  Completed    Mammogram  Discontinued

## 2024-09-23 ENCOUNTER — LAB ENCOUNTER (OUTPATIENT)
Dept: LAB | Age: 74
End: 2024-09-23
Attending: INTERNAL MEDICINE
Payer: MEDICARE

## 2024-09-23 DIAGNOSIS — E87.6 HYPOKALEMIA: ICD-10-CM

## 2024-09-23 LAB
ANION GAP SERPL CALC-SCNC: 10 MMOL/L (ref 0–18)
BUN BLD-MCNC: 10 MG/DL (ref 9–23)
CALCIUM BLD-MCNC: 10.3 MG/DL (ref 8.7–10.4)
CHLORIDE SERPL-SCNC: 100 MMOL/L (ref 98–112)
CO2 SERPL-SCNC: 26 MMOL/L (ref 21–32)
CREAT BLD-MCNC: 0.74 MG/DL
EGFRCR SERPLBLD CKD-EPI 2021: 84 ML/MIN/1.73M2 (ref 60–?)
FASTING STATUS PATIENT QL REPORTED: YES
GLUCOSE BLD-MCNC: 105 MG/DL (ref 70–99)
OSMOLALITY SERPL CALC.SUM OF ELEC: 281 MOSM/KG (ref 275–295)
POTASSIUM SERPL-SCNC: 3.9 MMOL/L (ref 3.5–5.1)
SODIUM SERPL-SCNC: 136 MMOL/L (ref 136–145)

## 2024-09-23 PROCEDURE — 36415 COLL VENOUS BLD VENIPUNCTURE: CPT

## 2024-09-23 PROCEDURE — 80048 BASIC METABOLIC PNL TOTAL CA: CPT

## 2024-11-04 ENCOUNTER — HOSPITAL ENCOUNTER (OUTPATIENT)
Dept: MAMMOGRAPHY | Age: 74
Discharge: HOME | End: 2024-11-04
Attending: INTERNAL MEDICINE
Payer: MEDICARE

## 2024-11-04 DIAGNOSIS — Z12.31 ENCOUNTER FOR SCREENING MAMMOGRAM FOR MALIGNANT NEOPLASM OF BREAST: ICD-10-CM

## 2024-11-04 PROCEDURE — 77063 BREAST TOMOSYNTHESIS BI: CPT | Performed by: INTERNAL MEDICINE

## 2024-11-04 PROCEDURE — 77067 SCR MAMMO BI INCL CAD: CPT | Performed by: INTERNAL MEDICINE

## 2024-11-25 DIAGNOSIS — I10 ESSENTIAL HYPERTENSION: ICD-10-CM

## 2024-11-25 NOTE — TELEPHONE ENCOUNTER
Arcelia Skinner requesting Medication Refill for:    Medication name and dose (copy and paste from medication list):     Potassium Chloride ER 10 MEQ Oral Tab CR -- -- 8/22/2024 --   Sig:   Take 1 tablet (10 mEq total) by mouth daily.     Route:   Oral     Class:   Historical     Order #:   [id number]       If medication is not on medication list - transfer patient to RN queue for triage    Preferred Pharmacy:   [id number] - [place] - [address] [phone], [phone]   [address] [place] [address]   Phone: [phone] Fax: [phone]   Hours: Not open 24 hours         LOV: 8/27/2024   Last Refill date: 8/2/2024  Next Scheduled appointment: 3/12/2025

## 2024-11-28 RX ORDER — LOSARTAN POTASSIUM AND HYDROCHLOROTHIAZIDE 25; 100 MG/1; MG/1
1 TABLET ORAL DAILY
Qty: 90 TABLET | Refills: 0 | Status: SHIPPED | OUTPATIENT
Start: 2024-11-28 | End: 2025-11-23

## 2024-11-28 NOTE — TELEPHONE ENCOUNTER
Refill passed per [facility] protocol.    Current med list did you want both ?  Medication List  As of 8/27/2024 11:16 AM    Losartan Potassium-HCTZ 100-25 MG 1 tablet Oral Daily        Triamterene-HCTZ 37.5-25 MG 1 table      Requested Prescriptions   Pending Prescriptions Disp Refills    LOSARTAN-HYDROCHLOROTHIAZIDE 100-25 MG Oral Tab [Pharmacy Med Name: Losartan Potassium/Hydrochlorothiazide 100-25 Mg Tab Solc] 90 tablet 0     Sig: Take 1 tablet by mouth daily.       Hypertension Medications Protocol Passed - 11/28/2024 10:39 AM        Passed - CMP or BMP in past 12 months        Passed - Last BP reading less than 140/90     BP Readings from Last 1 Encounters:   08/27/24 110/70               Passed - In person appointment or virtual visit in the past 12 mos or appointment in next 3 mos     Recent Outpatient Visits              3 months ago Encounter for Medicare annual wellness exam    [facility], [address] [place] [address] Shani Mccabe MD    Office Visit    6 months ago Essential hypertension    [address]    Office Visit    9 months ago Palpitations    [address]    Office Visit    11 months ago Essential hypertension    [address]    Office Visit    1 year ago Essential hypertension    [address]    Office Visit          Future Appointments         Provider Department Appt Notes    In 6 days Arlen Bai APRN [facility]astroenterology,  [facility] 10/21/24 yearly follow up with Nancie 12/04/24 @ [facility]    In 3 months Shani Suarez MD [address] 6 mo fu- r/s from 3/11, sm                    Passed - EGFRCR or GFRNAA > 50     GFR Evaluation  EGFRCR: 84 , resulted on 9/23/2024              Future Appointments         Provider Department Appt Notes    In 6 days Arlen Bai APRN [facility]astroenter[facility],  [facility] 10/21/24 yearly follow up with Nancie 12/04/24 @ [facility]    In 3 months Shani Suarez MD [facility], [address] [place] [address] 6 mo fu- r/s from 3/11,           Recent Outpatient Visits              3 months ago Encounter for Medicare annual wellness exam    [facility], [address] [place], [place] [address]Joana Tina, MD    Office Visit    6 months ago Essential hypertension    [address]    Office Visit    9 months ago Palpitations    [address]    Office Visit    11 months ago Essential hypertension    [facility], [address] [place], [place] [address]Joana Tina, MD    Office Visit    1 year ago Essential hypertension    [facility], [address] [place] [address] Shani Mccabe MD    Office Visit

## 2024-11-29 NOTE — TELEPHONE ENCOUNTER
Please review. Protocol Failed; No Protocol    Medication is listed as patient reported.     Requested Prescriptions   Pending Prescriptions Disp Refills    Potassium Chloride ER 10 MEQ Oral Tab CR  0     Sig: Take 1 tablet (10 mEq total) by mouth daily.       There is no refill protocol information for this order            Future Appointments         Provider Department Appt Notes    In 5 days Arlen Bai APRN [facility],  [facility] 10/21/24 yearly follow up with Nancie 12/04/24 @ 2 P jv    In 3 months Shani Suarez MD [address] 6 mo fu- r/s from 3/11,           Recent Outpatient Visits              3 months ago Encounter for Medicare annual wellness exam    [address]    Office Visit    6 months ago Essential hypertension    [address]    Office Visit    9 months ago Palpitations    [address]    Office Visit    11 months ago Essential hypertension    [address]    Office Visit    1 year ago Essential hypertension    [address]    Office Visit

## 2024-12-01 RX ORDER — POTASSIUM CHLORIDE 750 MG/1
10 TABLET, EXTENDED RELEASE ORAL DAILY
Qty: 90 TABLET | Refills: 1 | Status: SHIPPED | OUTPATIENT
Start: 2024-12-01

## 2024-12-17 ENCOUNTER — HOSPITAL ENCOUNTER (OUTPATIENT)
Dept: GENERAL RADIOLOGY | Age: 74
Discharge: HOME | End: 2024-12-17
Attending: NURSE PRACTITIONER
Payer: MEDICARE

## 2024-12-17 DIAGNOSIS — R15.2 FECAL URGENCY: ICD-10-CM

## 2024-12-17 DIAGNOSIS — R19.7 DIARRHEA, UNSPECIFIED TYPE: ICD-10-CM

## 2024-12-17 PROCEDURE — 74018 RADEX ABDOMEN 1 VIEW: CPT | Performed by: NURSE PRACTITIONER

## 2025-03-05 DIAGNOSIS — I10 ESSENTIAL HYPERTENSION: ICD-10-CM

## 2025-03-10 RX ORDER — LOSARTAN POTASSIUM AND HYDROCHLOROTHIAZIDE 25; 100 MG/1; MG/1
1 TABLET ORAL DAILY
Qty: 90 TABLET | Refills: 1 | Status: SHIPPED | OUTPATIENT
Start: 2025-03-10

## 2025-03-10 NOTE — TELEPHONE ENCOUNTER
Please advise if patient is to be on both losartan-hydrochlorothiazide and triamterene-hydrochlorothiazide. Duplicate therapy.    Please review: Medication passes protocol, but unable to refill due to medium/high/very high drug interaction warning copied here:      High  Drug-Drug: Triamterene-HCTZ and losartan-hydroCHLOROthiazideThe risk of hyperkalemia may be increased when potassium-sparing diuretics are co-administered with angiotensin II receptor antagonists.    Medium  Duplicate Therapy: Triamterene-HCTZ, losartan-hydroCHLOROthiazideTHIAZIDE DIURETICS. No Abuse/Dependency Potential.

## 2025-03-10 NOTE — TELEPHONE ENCOUNTER
Per office visit 2/12/24 with Dr. Suarez:  Essential hypertension- not controlled without the triamterene hydrochlorothiazide (she ran out few weeks ago), will restart it and continue other 2 medications the same. BMP on 2 diuretics wnl on 12/12/23. She gets too anxious monitoring BP at home so will check in office q3 months    Signed Prescriptions Disp Refills    metoprolol succinate  MG Oral Tablet 24 Hr 180 tablet 1       Sig: Take 1 tablet (100 mg total) by mouth 2 (two) times daily.    losartan-hydroCHLOROthiazide 100-25 MG Oral Tab 90 tablet 1       Sig: Take 1 tablet by mouth daily.    Triamterene-HCTZ 37.5-25 MG Oral Tab 90 tablet 1       Sig: Take 1 tablet by mouth daily.

## 2025-03-12 ENCOUNTER — LAB ENCOUNTER (OUTPATIENT)
Dept: LAB | Age: 75
End: 2025-03-12
Attending: INTERNAL MEDICINE
Payer: MEDICARE

## 2025-03-12 ENCOUNTER — HOSPITAL ENCOUNTER (OUTPATIENT)
Dept: GENERAL RADIOLOGY | Age: 75
Discharge: HOME | End: 2025-03-12
Attending: INTERNAL MEDICINE
Payer: MEDICARE

## 2025-03-12 ENCOUNTER — OFFICE VISIT (OUTPATIENT)
Dept: INTERNAL MEDICINE CLINIC | Facility: CLINIC | Age: 75
End: 2025-03-12
Payer: MEDICARE

## 2025-03-12 VITALS
DIASTOLIC BLOOD PRESSURE: 86 MMHG | HEART RATE: 52 BPM | WEIGHT: 132.38 LBS | SYSTOLIC BLOOD PRESSURE: 136 MMHG | TEMPERATURE: 98 F | BODY MASS INDEX: 24.36 KG/M2 | HEIGHT: 61.81 IN | RESPIRATION RATE: 16 BRPM

## 2025-03-12 DIAGNOSIS — M54.16 LUMBAR RADICULAR PAIN: Primary | ICD-10-CM

## 2025-03-12 DIAGNOSIS — I10 ESSENTIAL HYPERTENSION: ICD-10-CM

## 2025-03-12 DIAGNOSIS — K21.9 GASTROESOPHAGEAL REFLUX DISEASE, UNSPECIFIED WHETHER ESOPHAGITIS PRESENT: ICD-10-CM

## 2025-03-12 DIAGNOSIS — R63.5 WEIGHT GAIN: ICD-10-CM

## 2025-03-12 DIAGNOSIS — M79.604 RIGHT LEG PAIN: ICD-10-CM

## 2025-03-12 DIAGNOSIS — M54.16 LUMBAR RADICULAR PAIN: ICD-10-CM

## 2025-03-12 PROBLEM — F32.1 CURRENT MODERATE EPISODE OF MAJOR DEPRESSIVE DISORDER WITHOUT PRIOR EPISODE (HCC): Status: RESOLVED | Noted: 2023-05-19 | Resolved: 2025-03-12

## 2025-03-12 LAB
ALBUMIN SERPL-MCNC: 4.7 G/DL (ref 3.2–4.8)
ALBUMIN/GLOB SERPL: 1.8 {RATIO} (ref 1–2)
ALP LIVER SERPL-CCNC: 86 U/L
ALT SERPL-CCNC: 19 U/L
ANION GAP SERPL CALC-SCNC: 13 MMOL/L (ref 0–18)
AST SERPL-CCNC: 27 U/L (ref ?–34)
BASOPHILS # BLD AUTO: 0.08 X10(3) UL (ref 0–0.2)
BASOPHILS NFR BLD AUTO: 1.3 %
BILIRUB SERPL-MCNC: 0.5 MG/DL (ref 0.2–1.1)
BUN BLD-MCNC: 12 MG/DL (ref 9–23)
CALCIUM BLD-MCNC: 9.8 MG/DL (ref 8.7–10.6)
CHLORIDE SERPL-SCNC: 98 MMOL/L (ref 98–112)
CO2 SERPL-SCNC: 27 MMOL/L (ref 21–32)
CREAT BLD-MCNC: 0.8 MG/DL
EGFRCR SERPLBLD CKD-EPI 2021: 77 ML/MIN/1.73M2 (ref 60–?)
EOSINOPHIL # BLD AUTO: 0.14 X10(3) UL (ref 0–0.7)
EOSINOPHIL NFR BLD AUTO: 2.2 %
ERYTHROCYTE [DISTWIDTH] IN BLOOD BY AUTOMATED COUNT: 13.7 %
FASTING STATUS PATIENT QL REPORTED: NO
GLOBULIN PLAS-MCNC: 2.6 G/DL (ref 2–3.5)
GLUCOSE BLD-MCNC: 103 MG/DL (ref 70–99)
HCT VFR BLD AUTO: 40.7 %
HGB BLD-MCNC: 13.3 G/DL
IMM GRANULOCYTES # BLD AUTO: 0.02 X10(3) UL (ref 0–1)
IMM GRANULOCYTES NFR BLD: 0.3 %
LYMPHOCYTES # BLD AUTO: 2.25 X10(3) UL (ref 1–4)
LYMPHOCYTES NFR BLD AUTO: 35.9 %
MCH RBC QN AUTO: 30.1 PG (ref 26–34)
MCHC RBC AUTO-ENTMCNC: 32.7 G/DL (ref 31–37)
MCV RBC AUTO: 92.1 FL
MONOCYTES # BLD AUTO: 0.6 X10(3) UL (ref 0.1–1)
MONOCYTES NFR BLD AUTO: 9.6 %
NEUTROPHILS # BLD AUTO: 3.18 X10 (3) UL (ref 1.5–7.7)
NEUTROPHILS # BLD AUTO: 3.18 X10(3) UL (ref 1.5–7.7)
NEUTROPHILS NFR BLD AUTO: 50.7 %
OSMOLALITY SERPL CALC.SUM OF ELEC: 286 MOSM/KG (ref 275–295)
PLATELET # BLD AUTO: 256 10(3)UL (ref 150–450)
POTASSIUM SERPL-SCNC: 4.2 MMOL/L (ref 3.5–5.1)
PROT SERPL-MCNC: 7.3 G/DL (ref 5.7–8.2)
RBC # BLD AUTO: 4.42 X10(6)UL
SODIUM SERPL-SCNC: 138 MMOL/L (ref 136–145)
TSI SER-ACNC: 1.46 UIU/ML (ref 0.55–4.78)
WBC # BLD AUTO: 6.3 X10(3) UL (ref 4–11)

## 2025-03-12 PROCEDURE — 72110 X-RAY EXAM L-2 SPINE 4/>VWS: CPT | Performed by: INTERNAL MEDICINE

## 2025-03-12 PROCEDURE — 84443 ASSAY THYROID STIM HORMONE: CPT

## 2025-03-12 PROCEDURE — 1160F RVW MEDS BY RX/DR IN RCRD: CPT | Performed by: INTERNAL MEDICINE

## 2025-03-12 PROCEDURE — 1159F MED LIST DOCD IN RCRD: CPT | Performed by: INTERNAL MEDICINE

## 2025-03-12 PROCEDURE — 36415 COLL VENOUS BLD VENIPUNCTURE: CPT

## 2025-03-12 PROCEDURE — G2211 COMPLEX E/M VISIT ADD ON: HCPCS | Performed by: INTERNAL MEDICINE

## 2025-03-12 PROCEDURE — 80053 COMPREHEN METABOLIC PANEL: CPT

## 2025-03-12 PROCEDURE — 85025 COMPLETE CBC W/AUTO DIFF WBC: CPT

## 2025-03-12 PROCEDURE — 99214 OFFICE O/P EST MOD 30 MIN: CPT | Performed by: INTERNAL MEDICINE

## 2025-03-12 RX ORDER — METHYLPREDNISOLONE 4 MG/1
TABLET ORAL
Qty: 1 EACH | Refills: 0 | Status: SHIPPED | OUTPATIENT
Start: 2025-03-12

## 2025-03-12 NOTE — PROGRESS NOTES
Arcelia Skinner is a 74 year old female.    Chief Complaint   Patient presents with    Follow - Up     ES rm - 4 - 6 mo f/u for HTN    Leg Pain     Since fall of last year       HPI:     Pleasant patient with HTN, HL, GERD, anxiety here for follow up. C/o right leg pain ever since her  vacation in the fall of 2024. She [place] for long periods and this brought on the symptoms. She tried advil and rest but symptoms are not going away. She had symptoms in the left leg too but this has resolved over time. Mild LBP. Pain mostly from right buttock to upper leg and then the knee. The knee itself feels ok, no swelling or redness. Pain worse with prolonged walking. Can get up to 8/10  She recently saw [facility] for GERD, symptoms controlled on omeprazole. She is compliant with BP medications, 136/86 both arms after sitting down for 5 minutes. No CP/HA/DZ  She has gained about 10 pounds since last year, eating about the same but less active.     Patient Active Problem List   Diagnosis    Allergic rhinitis    Essential hypertension    GERD (gastroesophageal reflux disease)    Pure hypercholesterolemia    History of depression    Hiatal hernia    Acute pain of left knee    Anxiety    Palpitations    Posterior neck pain     Current Outpatient Medications   Medication Sig Dispense Refill    methylPREDNISolone (MEDROL) 4 MG Oral Tablet Therapy Pack As directed. 1 each 0    losartan-hydroCHLOROthiazide 100-25 MG Oral Tab Take 1 tablet by mouth daily. 90 tablet 1    Omeprazole 40 MG Oral Capsule Delayed Release Take 1 capsule (40 mg total) by mouth daily. 1/2 hour prior to breakfast 90 capsule 3    Potassium Chloride ER 10 MEQ Oral Tab CR Take 1 tablet (10 mEq total) by mouth daily. 90 tablet 1    metoprolol succinate  MG Oral Tablet 24 Hr Take 1 tablet (100 mg total) by mouth 2 (two) times daily. 180 tablet 3    Triamterene-HCTZ 37.5-25 MG Oral Tab Take 1 tablet by mouth daily. 90 tablet 3    simvastatin  40 MG Oral Tab Take 1 tablet (40 mg total) by mouth daily. 90 tablet 3    Clobetasol Propionate 0.05 % External Ointment apply nightly to affected area x1 week, then every other night x1 week, then twice a week x1 week. 60 g 0    Capsaicin 0.075 % External Cream Apply 2-3x per day 60 g 2    triamcinolone acetonide 0.1 % External Cream       clonazePAM 0.25 MG Oral Tablet Dispersible Take 1 tablet (0.25 mg total) by mouth as needed.      venlafaxine  MG Oral Capsule SR 24 Hr Take 1 capsule (150 mg total) by mouth daily. 180 capsule 0      Past Medical History:    Abdominal hernia    Anxiety state    Arthritis    Bloating    Depression    Diarrhea, unspecified    Esophageal reflux    Hearing impairment    [place] left ear - aid used    Hearing loss    High blood pressure    High cholesterol    History of depression    Insomnia    Meniere disease    MENOPAUSE    Painful swallowing    Visual impairment    glasses,contacts    Wears glasses      Social History:  Social History     Socioeconomic History    Marital status:    Tobacco Use    Smoking status: Never     Passive exposure: Never    Smokeless tobacco: Never   Vaping Use    Vaping status: Never Used   Substance and Sexual Activity    Alcohol use: Yes     Alcohol/week: 3.0 standard drinks of alcohol     Types: 3 Glasses of wine per week     Comment: weekly    Drug use: No    Sexual activity: Not Currently     Social Drivers of Health     Food Insecurity: No Food Insecurity (3/12/2025)    NCSS - Food Insecurity     Worried About Running Out of Food in the Last Year: No     Ran Out of Food in the Last Year: No   Transportation Needs: No Transportation Needs (3/12/2025)    NCSS - Transportation     Lack of Transportation: No   Housing Stability: Not At Risk (3/12/2025)    NCSS - Housing/Utilities     Has Housing: Yes     Worried About Losing Housing: No     Unable to Get Utilities: No     Family History   Problem Relation Age of Onset    Cancer Mother      Breast Cancer Mother 81        80s    Cancer Brother         pancreatic        Allergies  Allergies[1]      REVIEW OF SYSTEMS:   GENERAL HEALTH:  no fevers +weight gain  RESPIRATORY: no cough  CARDIOVASCULAR: denies chest pain  GI: denies abdominal pain  : no dysuria  NEURO: denies headaches, focal weakness  MS: radiating pain R leg  PSYCH: No reported depression   HEME: No adenopathy      EXAM:   /86   Pulse 52   Temp 97.8 °F (36.6 °C) (Temporal)   Resp 16   Ht 5' 1.81\" (1.57 m)   Wt 132 lb 6.4 oz (60.1 kg)   LMP  (LMP Unknown)   BMI 24.36 kg/m²   GENERAL: well developed, well nourished,in no apparent distress  HEENT: atraumatic, normocephalic  NECK: supple,no TM  LUNGS: normal rate without respiratory distress, lungs clear to auscultation  CARDIO: RRR nl S1 S2  GI: normal bowel sounds, soft,obese,  NT/ND  Spine: mild lumbar TTP, +SLR on right.   EXTREMITIES: no cyanosis, clubbing or edema. Right knee without swelling or redness, ROM intact  NEURO: Alert and oriented, no focal deficits    ASSESSMENT AND PLAN:     Encounter Diagnoses   Name     Lumbar radicular pain- symptoms for 5+ months, will check lumbar spine xray, trial medrol and then PT if pain persists     Right leg pain-  as above          Weight gain- likely from less activity over the last 6 months, will check TSH     Essential hypertension- controlled, CPM     Gastroesophageal reflux disease, unspecified whether esophagitis present- controlled, CPM              Orders Placed This Encounter   Procedures    TSH W Reflex To Free T4 [E]    CBC W Differential W Platelet [E]    Comp Metabolic Panel (14)       Meds & Refills for this Visit:  Requested Prescriptions     Signed Prescriptions Disp Refills    methylPREDNISolone (MEDROL) 4 MG Oral Tablet Therapy Pack 1 each 0     Sig: As directed.       Imaging & Consults:  OP REFERRAL TO [facility]    Return in about 3 months (around 6/12/2025), or if symptoms worsen or fail to  improve, for annual visit.  There are no Patient Instructions on file for this visit.      The patient indicates understanding of these issues and agrees to the plan.           [1] No Known Allergies

## 2025-04-23 ENCOUNTER — TELEPHONE (OUTPATIENT)
Dept: PHYSICAL THERAPY | Facility: HOSPITAL | Age: 75
End: 2025-04-23

## 2025-04-29 ENCOUNTER — OFFICE VISIT (OUTPATIENT)
Dept: PHYSICAL THERAPY | Age: 75
End: 2025-04-29
Attending: INTERNAL MEDICINE
Payer: MEDICARE

## 2025-04-29 DIAGNOSIS — M54.16 LUMBAR RADICULAR PAIN: Primary | ICD-10-CM

## 2025-04-29 DIAGNOSIS — M79.604 RIGHT LEG PAIN: ICD-10-CM

## 2025-04-29 PROCEDURE — 97110 THERAPEUTIC EXERCISES: CPT

## 2025-04-29 PROCEDURE — 97161 PT EVAL LOW COMPLEX 20 MIN: CPT

## 2025-04-29 PROCEDURE — 97140 MANUAL THERAPY 1/> REGIONS: CPT

## 2025-04-29 NOTE — PROGRESS NOTES
SPINE EVALUATION:     Diagnosis:   Lumbar radicular pain (M54.16)  Right leg pain (M79.604) Patient:  Arcelia Skinner (74 year old, female)        Referring Provider: Shani Suarez  Today's Date   4/29/2025    Precautions:  None   Date of Evaluation: 04/29/25  Next MD visit: June 25, 2025  Date of Surgery: None     PATIENT SUMMARY   Summary of chief complaints: pain to R low back radiating down to the R knee  History of current condition: Pt reporting she went on a  trip in Oct 2024. She was doing a lot of walking on gravel, especially uphill/ downhill and stairs. Since the onset, symptoms were getting worse but recently symptoms have remained the same. Pt reporting she has another trip end of July and is worried she will not be able to navigate stairs. Pt denies any falls within the last year. Pt reporting she takes tynenol PM and/ or ibuprofen PM.   Pain level: current 0 /10, at best 0 /10, at worst 8 /10  Description of symptoms: sharp, dull   Occupation: not working   Leisure activities/Hobbies: walking   Prior level of function: good  Current limitations: household activites- cooking and cleaning, prolonged walking >30 minutes, sleeping on R side, bed mobility, prolonged standing >30 minutes, ascending and descending stairs  Pt goals: pain to decrease, increase LE strength  Red flag signs/symptoms: Pt denies dizziness, drop attacks, dysphagia, dysarthria, diplopia; Pt denies changes in bowel/bladder function, saddle anesthesia; Pt denies pain that wakes in sleep, fever, recent trauma, history of CA, pain unchanged with movement/activity    Past medical history was reviewed with Arcelia.  Significant findings include:    Imaging/Tests: Xray 3/12/2025  Impression  CONCLUSION:       1. Moderate levoscoliosis of the lumbar spine.     2. Moderate degenerative disc disease most pronounced at L1-2 and L2-3 eccentric to the right.  There is also moderate degenerative disc disease at L5-S1.   Arcelia  has a past  medical history of Abdominal hernia, Anxiety state, Arthritis, Bloating, Depression, Diarrhea, unspecified, Esophageal reflux, Hearing impairment, Hearing loss, High blood pressure, High cholesterol, History of depression, Insomnia, Meniere disease, MENOPAUSE, Painful swallowing, Visual impairment, and Wears glasses.  She  has a past surgical history that includes hysterectomy (08/2000); other surgical history (04/2011); arthroscopy of joint unlisted; cataract (Left, 03/2019); correct bunion,othr methods (Left); other (2016); Eye surgery (Left, 06/2018); colonoscopy (07/2010); upper gi endoscopy,exam (7/2012, 12/2017); colonoscopy (N/A, 05/19/2022); and hernia surgery.    ASSESSMENT  Arcelia presents to physical therapy evaluation with primary c/o pain to R low back radiating down to the R knee. The results of the objective tests and measures show decreased lumbar ROM, decreased LE strength, increased muscular tenderness. Functional deficits include but are not limited to household activites- cooking and cleaning, prolonged walking >30 minutes, sleeping on R side, bed mobility, prolonged standing >30 minutes, ascending and descending stairs. Signs and symptoms are consistent with diagnosis of Lumbar radicular pain (M54.16)  Right leg pain (M79.604). Pt and PT discussed evaluation findings, pathology, POC and HEP.  Pt voiced understanding and performs HEP correctly without reported pain. Skilled Physical Therapy is medically necessary to address the above impairments and reach functional goals.    OBJECTIVE:    Musculoskeletal:  Observation/Posture: decreased cervical lordosis; forward head posture; increased thoracic kyphosis; rounded shoulders   Accessory Motion: NT   Palpation: TTP thoracic and lumbar parapsinals, R QL       ROM and Strength:  (* denotes performed with pain)  Trunk ROM MMT (-/5)     Flex 74       Ext 10      R L R L     Side bend WFL WFL         Rotation WFL WFL       ,   Hip   ROM MMT (-/5)    R L  R L     Flex (L2)     3+ 4-     Ext      3+ 3+     Abd     3+ 3+     ER             IR            ,   Knee   ROM MMT (-/5)    R L R L     Flex     4- 4-     Ext (L3)     4- 4-     ,   Ankle/Foot   ROM MMT (-/5)    R L R L     PF     4+ 4+     DF (L4)     4+ 4+     Inversion             Eversion             Grt Toe Ext (L5)                 Neurological:  Sensation: grossly intact to light touch MELISSA UE/LE       Balance and Functional Mobility:  Gait: pt ambulates on level ground with decreased step length; decreased stance phase; decreased arm swing.       Today's Treatment and Response:   Pt education was provided on exam findings, treatment diagnosis, treatment plan, expectations, and prognosis.  Today's Treatment       4/29/2025   Spine Treatment   Therapeutic Exercise Supine LTR x10, 5 iso hold  Supine bridges x10   Hooklying clamshells x10 red   Supine TrA bracing x10, 5 iso hold    Pt education  -spine, hip anatomy and physiology   -ice vs heat modalities, recommending heat        Manual Therapy STM lumbar and thoracic paraspinals, R QL    Therapeutic Exercise Minutes 15   Manual Therapy Minutes 10   Evaluation Minutes 20   Total Time Of Timed Procedures 25   Total Time Of Service-Based Procedures 20   Total Treatment Time 45   HEP Access Code: [email or web address]  URL: [email or web address]  Date: 04/29/2025  Prepared by: Amber Walters    Exercises  - Supine Lower Trunk Rotation  - 1 x daily - 7 x weekly - 3 sets - 10 reps  - Seated Lumbar Flexion Stretch  - 1 x daily - 7 x weekly - 3 sets - 10 reps  - Hooklying Clamshell with Resistance  - 1 x daily - 7 x weekly - 2 sets - 10 reps  - Supine Bridge  - 1 x daily - 7 x weekly - 2 sets - 10 reps  - Supine Transversus Abdominis Bracing - Hands on Stomach  - 1 x daily - 7 x weekly - 2 sets - 10 reps        Patient was instructed in and issued a HEP for: Access Code: [email or web address]  URL: [email or web address]  Date: 04/29/2025  Prepared by:  Leslieandra Walters    Exercises  - Supine Lower Trunk Rotation  - 1 x daily - 7 x weekly - 3 sets - 10 reps  - Seated Lumbar Flexion Stretch  - 1 x daily - 7 x weekly - 3 sets - 10 reps  - Hooklying Clamshell with Resistance  - 1 x daily - 7 x weekly - 2 sets - 10 reps  - Supine Bridge  - 1 x daily - 7 x weekly - 2 sets - 10 reps  - Supine Transversus Abdominis Bracing - Hands on Stomach  - 1 x daily - 7 x weekly - 2 sets - 10 reps    Charges:  PT EVAL: Low Complexity, 1 low complex, 1 therex, 1 manual  In agreement with evaluation findings and clinical rationale, this evaluation involved LOW COMPLEXITY decision making due to no personal factors/comorbidities, 1-2 body structures involved/activity limitations, and stable symptoms as documented in the evaluation.                                                                         PLAN OF CARE:    Goals: (to be met in 8 visits)    Not Met Progress Toward Partially Met Met   Pt will improve transversus abdominis recruitment to perform proper isometric contraction without requiring verbal or tactile cuing to promote advancement of therex. [] [] [] []   Pt will demonstrate good understanding of proper posture and body mechanics to decrease pain and improve spinal safety. [] [] [] []   Pt will improve lumbar spine AROM flexion to >80 deg with no pain to allow increase ease with bending forward to don shoes. [] [] [] []   Pt will report sleeping without waking up in middle of night >4/7 days of the week to be able to sleep with no pain and return to prior level of function.  [] [] [] []   Pt will have decreased paraspinal mm tension to tolerate standing >30 minutes for home activities including cooking.  [] [] [] []   Pt will report tolerating standing for >30 minutes with minimal to no pain to be able to perform household activities including cleaning.  [] [] [] []   Pt will demonstrate ascending and descending a flight of stairs with hand rail use to be able to navigate  stairs at home and within the community.  [] [] [] []   Patient will demonstrate a score of 63 or higher in the LEFS outcome measure to report significant difference. (Eval 53) [] [] [] []   Pt will be independent and compliant with comprehensive HEP to maintain progress achieved in PT [] [] [] []          Frequency / Duration: Patient will be seen 2x/week or a total of 8  visits over a 90 day period. Treatment will include: Gait training; Manual Therapy; Mechanical Traction; Neuromuscular Re-education; Therapeutic Exercise; Therapeutic Activities; Home Exercise Program instruction; Electrical stimulation (unattended); Electrical stimulation (attended); Ultrasound; Patient/Family Education    Education or treatment limitation: None   Rehab Potential: good     LEFS Score  LEFS Score: (Patient-Rptd) 53.75 % (4/24/2025 10:04 AM)      Patient/Family/Caregiver was advised of these findings, precautions, and treatment options and has agreed to actively participate in planning and for this course of care.    Thank you for your referral. Please co-sign or sign and return this letter via fax as soon as possible to [phone]. If you have any questions, please contact me at Dept: [phone]    Sincerely,  Electronically signed by therapist: Amber Walters PT  Physician's certification required: Yes  I certify the need for these services furnished under this plan of treatment and while under my care.    X___________________________________________________ Date____________________    Certification From: 4/29/2025  To:7/28/2025

## 2025-05-01 ENCOUNTER — OFFICE VISIT (OUTPATIENT)
Dept: PHYSICAL THERAPY | Age: 75
End: 2025-05-01
Attending: INTERNAL MEDICINE
Payer: MEDICARE

## 2025-05-01 PROCEDURE — 97112 NEUROMUSCULAR REEDUCATION: CPT

## 2025-05-01 PROCEDURE — 97110 THERAPEUTIC EXERCISES: CPT

## 2025-05-01 NOTE — PROGRESS NOTES
Patient: Arcelia Skinner (74 year old, female) Referring Provider:  Insurance:   Diagnosis: Lumbar radicular pain (M54.16)  Right leg pain (M79.604) Shani Dnaiela DENG   Date of Surgery: None Next MD visit:  N/A   Precautions:  None 6/25/25 Referral Information:    Date of Evaluation: Req: 0, Auth: 0, Exp:     No data recorded POC Auth Visits:          Today's Date   5/1/2025    Subjective  Pt reporting the back has been feeling good, she has been doing HEP. Felt some discomfort to the back with the bridges exercise but then went away as she continued the exercise.       Pain: 0/10     Objective             Assessment  Continued with hip and core strenghening. Pt tolerating new intervenitons. Will continue to progress per pt tolerance. HEP was updated and reviewed with pt.     Goals (to be met in 8 visits)      Not Met Progress Toward Partially Met Met   Pt will improve transversus abdominis recruitment to perform proper isometric contraction without requiring verbal or tactile cuing to promote advancement of therex. [] [] [] []   Pt will demonstrate good understanding of proper posture and body mechanics to decrease pain and improve spinal safety. [] [] [] []   Pt will improve lumbar spine AROM flexion to >80 deg with no pain to allow increase ease with bending forward to don shoes. [] [] [] []   Pt will report sleeping without waking up in middle of night >4/7 days of the week to be able to sleep with no pain and return to prior level of function.  [] [] [] []   Pt will have decreased paraspinal mm tension to tolerate standing >30 minutes for home activities including cooking.  [] [] [] []   Pt will report tolerating standing for >30 minutes with minimal to no pain to be able to perform household activities including cleaning.  [] [] [] []   Pt will demonstrate ascending and descending a flight of stairs with hand rail use to be able to navigate stairs at home and within the community.  [] [] [] []   Patient  will demonstrate a score of 63 or higher in the LEFS outcome measure to report significant difference. (Eval 53) [] [] [] []   Pt will be independent and compliant with comprehensive HEP to maintain progress achieved in PT [] [] [] []              Plan  cont hip and core strengthening    Treatment Last 4 Visits  Treatment Day: 2       4/29/2025 5/1/2025   Spine Treatment   Therapeutic Exercise Supine LTR x10, 5 iso hold  Supine bridges x10   Hooklying clamshells x10 red   Supine TrA bracing x10, 5 iso hold    Pt education  -spine, hip anatomy and physiology   -ice vs heat modalities, recommending heat      NuStep level 5, 8 minutes     Supine bridges 2x10   Hooklying clamshells 2x10 red each   Supine SLR 2x10 each   S/l leg raises 2x10 each     Standing hip flexion 2x10 yellow, abd 2x10 yellow, hip ext 2x10 yellow     Side stepping yellow 10ft x6    HEP updated and reviewed         Neuro Re-Education  Supine TrA bracing SB 2x10, 5 iso hold   Supine TrA bracing SB w/ alt overhead arm raise 2x10, 5 iso hold  Supine mod dead bugs 2x10    Manual Therapy STM lumbar and thoracic paraspinals, R QL     Therapeutic Exercise Minutes 15 30   Neuro Re-Educ Minutes  15   Manual Therapy Minutes 10    Evaluation Minutes 20    Total Time Of Timed Procedures 25 45   Total Time Of Service-Based Procedures 20 0   Total Treatment Time 45 45   HEP Access Code: [email or web address]  URL: [email or web address]  Date: 04/29/2025  Prepared by: Amber Walters    Exercises  - Supine Lower Trunk Rotation  - 1 x daily - 7 x weekly - 3 sets - 10 reps  - Seated Lumbar Flexion Stretch  - 1 x daily - 7 x weekly - 3 sets - 10 reps  - Hooklying Clamshell with Resistance  - 1 x daily - 7 x weekly - 2 sets - 10 reps  - Supine Bridge  - 1 x daily - 7 x weekly - 2 sets - 10 reps  - Supine Transversus Abdominis Bracing - Hands on Stomach  - 1 x daily - 7 x weekly - 2 sets - 10 reps Access Code: [email or web address]  URL:  [email or web address]  Date: 05/01/2025  Prepared by: Ysandra Walters    Exercises  - Supine Lower Trunk Rotation  - 1 x daily - 7 x weekly - 3 sets - 10 reps  - Seated Lumbar Flexion Stretch  - 1 x daily - 7 x weekly - 3 sets - 10 reps  - Hooklying Clamshell with Resistance  - 1 x daily - 7 x weekly - 2 sets - 10 reps  - Supine Bridge  - 1 x daily - 7 x weekly - 2 sets - 10 reps  - Supine Transversus Abdominis Bracing - Hands on Stomach  - 1 x daily - 7 x weekly - 2 sets - 10 reps  - Hip Extension with Resistance Loop  - 1 x daily - 7 x weekly - 2 sets - 10 reps  - Hip Abduction with Resistance Loop  - 1 x daily - 7 x weekly - 2 sets - 10 reps  - Standing Hip Flexion with Resistance Loop  - 1 x daily - 7 x weekly - 2 sets - 10 reps        HEP  Access Code: [email or web address]  URL: [email or web address]  Date: 05/01/2025  Prepared by: Ysandra Walters    Exercises  - Supine Lower Trunk Rotation  - 1 x daily - 7 x weekly - 3 sets - 10 reps  - Seated Lumbar Flexion Stretch  - 1 x daily - 7 x weekly - 3 sets - 10 reps  - Hooklying Clamshell with Resistance  - 1 x daily - 7 x weekly - 2 sets - 10 reps  - Supine Bridge  - 1 x daily - 7 x weekly - 2 sets - 10 reps  - Supine Transversus Abdominis Bracing - Hands on Stomach  - 1 x daily - 7 x weekly - 2 sets - 10 reps  - Hip Extension with Resistance Loop  - 1 x daily - 7 x weekly - 2 sets - 10 reps  - Hip Abduction with Resistance Loop  - 1 x daily - 7 x weekly - 2 sets - 10 reps  - Standing Hip Flexion with Resistance Loop  - 1 x daily - 7 x weekly - 2 sets - 10 reps    Charges  2 therex, 1 neuro re-ed

## 2025-05-06 ENCOUNTER — OFFICE VISIT (OUTPATIENT)
Dept: PHYSICAL THERAPY | Age: 75
End: 2025-05-06
Attending: INTERNAL MEDICINE
Payer: MEDICARE

## 2025-05-06 PROCEDURE — 97110 THERAPEUTIC EXERCISES: CPT

## 2025-05-06 PROCEDURE — 97140 MANUAL THERAPY 1/> REGIONS: CPT

## 2025-05-06 PROCEDURE — 97112 NEUROMUSCULAR REEDUCATION: CPT

## 2025-05-06 NOTE — PROGRESS NOTES
Patient: Arcelia Skinner (74 year old, female) Referring Provider:  Insurance:   Diagnosis: Lumbar radicular pain (M54.16)  Right leg pain (M79.604) Shani Daniela DENG   Date of Surgery: None Next MD visit:  N/A   Precautions:  None 6/25/25 Referral Information:    Date of Evaluation: Req: 0, Auth: 0, Exp:     No data recorded POC Auth Visits:  8       Today's Date   5/6/2025    Subjective  Pt reporting she continues to have leg pain mostly at night.       Pain: 0/10     Objective             Assessment  Continued with hip and core strenghening. Pt demonstrating increase tenderness to lumbar paraspinals, R QL, and gluteal musculature, addressed with manual therapy as shown above. Adding LTR for lumbar mobility, pt tolerating well. Will continue to progress per pt tolerance.    Goals (to be met in 8 visits)      Not Met Progress Toward Partially Met Met   Pt will improve transversus abdominis recruitment to perform proper isometric contraction without requiring verbal or tactile cuing to promote advancement of therex. [] [x] [] []   Pt will demonstrate good understanding of proper posture and body mechanics to decrease pain and improve spinal safety. [] [x] [] []   Pt will improve lumbar spine AROM flexion to >80 deg with no pain to allow increase ease with bending forward to don shoes. [] [x] [] []   Pt will report sleeping without waking up in middle of night >4/7 days of the week to be able to sleep with no pain and return to prior level of function.  [] [x] [] []   Pt will have decreased paraspinal mm tension to tolerate standing >30 minutes for home activities including cooking.  [] [x] [] []   Pt will report tolerating standing for >30 minutes with minimal to no pain to be able to perform household activities including cleaning.  [] [x] [] []   Pt will demonstrate ascending and descending a flight of stairs with hand rail use to be able to navigate stairs at home and within the community.  [] [x] [] []    Patient will demonstrate a score of 63 or higher in the LEFS outcome measure to report significant difference. (Eval 53) [] [x] [] []   Pt will be independent and compliant with comprehensive HEP to maintain progress achieved in PT [] [x] [] []                  Plan  cont hip and core strengthening- add palloff press and rotation    Treatment Last 4 Visits  Treatment Day: 3       4/29/2025 5/1/2025 5/6/2025   Spine Treatment   Therapeutic Exercise Supine LTR x10, 5 iso hold  Supine bridges x10   Hooklying clamshells x10 red   Supine TrA bracing x10, 5 iso hold    Pt education  -spine, hip anatomy and physiology   -ice vs heat modalities, recommending heat      NuStep level 5, 8 minutes     Supine bridges 2x10   Hooklying clamshells 2x10 red each   Supine SLR 2x10 each   S/l leg raises 2x10 each     Standing hip flexion 2x10 yellow, abd 2x10 yellow, hip ext 2x10 yellow     Side stepping yellow 10ft x6    HEP updated and reviewed       NuStep level 5, 8 minutes     Supine SLR 2x10 each   S/l leg raises 2x10 each     LTR x20, 5 iso hold     Standing hip flexion 2x10 yellow, abd 2x10 yellow, hip ext 2x10 yellow        Neuro Re-Education  Supine TrA bracing SB 2x10, 5 iso hold   Supine TrA bracing SB w/ alt overhead arm raise 2x10, 5 iso hold  Supine mod dead bugs 2x10  Supine TrA bracing SB 2x10, 5 iso hold  Supine TrA bracing SB w/ alt overhead arm raise 2x10, 5 iso hold  Supine mod dead bugs 2x10         Manual Therapy STM lumbar and thoracic paraspinals, R QL   STM lumbar and thoracic paraspinals, R QL, R piriformis    Therapeutic Exercise Minutes 15 30 21   Neuro Re-Educ Minutes  15 12   Manual Therapy Minutes 10  12   Evaluation Minutes 20     Total Time Of Timed Procedures 25 45 45   Total Time Of Service-Based Procedures 20 0 0   Total Treatment Time 45 45 45   HEP Access Code: [email or web address]  URL: [email or web address]  Date: 04/29/2025  Prepared by: Amber Walters    Exercises  - Supine Lower  Trunk Rotation  - 1 x daily - 7 x weekly - 3 sets - 10 reps  - Seated Lumbar Flexion Stretch  - 1 x daily - 7 x weekly - 3 sets - 10 reps  - Hooklying Clamshell with Resistance  - 1 x daily - 7 x weekly - 2 sets - 10 reps  - Supine Bridge  - 1 x daily - 7 x weekly - 2 sets - 10 reps  - Supine Transversus Abdominis Bracing - Hands on Stomach  - 1 x daily - 7 x weekly - 2 sets - 10 reps Access Code: [email or web address]  URL: [email or web address]  Date: 05/01/2025  Prepared by: Ysandra Walters    Exercises  - Supine Lower Trunk Rotation  - 1 x daily - 7 x weekly - 3 sets - 10 reps  - Seated Lumbar Flexion Stretch  - 1 x daily - 7 x weekly - 3 sets - 10 reps  - Hooklying Clamshell with Resistance  - 1 x daily - 7 x weekly - 2 sets - 10 reps  - Supine Bridge  - 1 x daily - 7 x weekly - 2 sets - 10 reps  - Supine Transversus Abdominis Bracing - Hands on Stomach  - 1 x daily - 7 x weekly - 2 sets - 10 reps  - Hip Extension with Resistance Loop  - 1 x daily - 7 x weekly - 2 sets - 10 reps  - Hip Abduction with Resistance Loop  - 1 x daily - 7 x weekly - 2 sets - 10 reps  - Standing Hip Flexion with Resistance Loop  - 1 x daily - 7 x weekly - 2 sets - 10 reps         HEP  Access Code: [email or web address]  URL: [email or web address]  Date: 05/01/2025  Prepared by: Ysandra Walters    Exercises  - Supine Lower Trunk Rotation  - 1 x daily - 7 x weekly - 3 sets - 10 reps  - Seated Lumbar Flexion Stretch  - 1 x daily - 7 x weekly - 3 sets - 10 reps  - Hooklying Clamshell with Resistance  - 1 x daily - 7 x weekly - 2 sets - 10 reps  - Supine Bridge  - 1 x daily - 7 x weekly - 2 sets - 10 reps  - Supine Transversus Abdominis Bracing - Hands on Stomach  - 1 x daily - 7 x weekly - 2 sets - 10 reps  - Hip Extension with Resistance Loop  - 1 x daily - 7 x weekly - 2 sets - 10 reps  - Hip Abduction with Resistance Loop  - 1 x daily - 7 x weekly - 2 sets - 10 reps  - Standing Hip Flexion with Resistance Loop  - 1 x daily - 7  x weekly - 2 sets - 10 reps    Charges  1 manual, 1 neuro re-ed, 1 therex

## 2025-05-08 ENCOUNTER — OFFICE VISIT (OUTPATIENT)
Dept: PHYSICAL THERAPY | Age: 75
End: 2025-05-08
Attending: INTERNAL MEDICINE
Payer: MEDICARE

## 2025-05-08 PROCEDURE — 97110 THERAPEUTIC EXERCISES: CPT

## 2025-05-08 NOTE — PROGRESS NOTES
Patient: Arcelia Skinner (74 year old, female) Referring Provider:  Insurance:   Diagnosis: Lumbar radicular pain (M54.16)  Right leg pain (M79.604) Shani Suarez AECORKYSHOAIB MCR   Date of Surgery: None Next MD visit:  N/A   Precautions:  None 6/25/25 Referral Information:    Date of Evaluation: Req: 0, Auth: 0, Exp:     No data recorded POC Auth Visits:  8       Today's Date   5/8/2025    Subjective  Pt reporting she notes the pain when she is sitting.       Pain: 5/10     Objective          Special tests:  Lumbar repeated motions flexion and ext: no pain with either motions.   Hip Scour R -.   Hip ELIZA R 38 cm from table L 34 cm.   Piriformis R +     Assessment  Hip and lumbar special tests performed as shown above. Pt demonstrating R piriformis tightness. HEP updated adding and focusing on piriformis stretching until next session trying to reduce symptoms when seated. Pt reporting no pain at the end of the session.    Goals (to be met in 8 visits)      Not Met Progress Toward Partially Met Met   Pt will improve transversus abdominis recruitment to perform proper isometric contraction without requiring verbal or tactile cuing to promote advancement of therex. [] [x] [] []   Pt will demonstrate good understanding of proper posture and body mechanics to decrease pain and improve spinal safety. [] [x] [] []   Pt will improve lumbar spine AROM flexion to >80 deg with no pain to allow increase ease with bending forward to don shoes. [] [x] [] []   Pt will report sleeping without waking up in middle of night >4/7 days of the week to be able to sleep with no pain and return to prior level of function.  [] [x] [] []   Pt will have decreased paraspinal mm tension to tolerate standing >30 minutes for home activities including cooking.  [] [x] [] []   Pt will report tolerating standing for >30 minutes with minimal to no pain to be able to perform household activities including cleaning.  [] [x] [] []   Pt will demonstrate  ascending and descending a flight of stairs with hand rail use to be able to navigate stairs at home and within the community.  [] [x] [] []   Patient will demonstrate a score of 63 or higher in the LEFS outcome measure to report significant difference. (Eval 53) [] [x] [] []   Pt will be independent and compliant with comprehensive HEP to maintain progress achieved in PT [] [x] [] []                      Plan  re-assess pirirformis pain, cont with HEP    Treatment Last 4 Visits  Treatment Day: 4       4/29/2025 5/1/2025 5/6/2025 5/8/2025   Spine Treatment   Therapeutic Exercise Supine LTR x10, 5 iso hold  Supine bridges x10   Hooklying clamshells x10 red   Supine TrA bracing x10, 5 iso hold    Pt education  -spine, hip anatomy and physiology   -ice vs heat modalities, recommending heat      NuStep level 5, 8 minutes     Supine bridges 2x10   Hooklying clamshells 2x10 red each   Supine SLR 2x10 each   S/l leg raises 2x10 each     Standing hip flexion 2x10 yellow, abd 2x10 yellow, hip ext 2x10 yellow     Side stepping yellow 10ft x6    HEP updated and reviewed       NuStep level 5, 8 minutes     Supine SLR 2x10 each   S/l leg raises 2x10 each     LTR x20, 5 iso hold     Standing hip flexion 2x10 yellow, abd 2x10 yellow, hip ext 2x10 yellow      NuStep level 5, 7 minutes     Lumbar/ hip assessment- special tests     Supine SLR 2x10 each   S/l leg raises 2x10 each   Bridge w/ ball squeeze 2x10     Hooklying clamshells 2x10 red unilat     Standing hip flexion 2x10 red, abd 2x10 red, hip ext 2x10 red     Side steps 10ft x6 red    Supine piriformis stretch 30 sec x4  Seated piriformis stretch 30 sec x4     HEP updated and reviewed- focusing on piriformis stretching    Neuro Re-Education  Supine TrA bracing SB 2x10, 5 iso hold   Supine TrA bracing SB w/ alt overhead arm raise 2x10, 5 iso hold  Supine mod dead bugs 2x10  Supine TrA bracing SB 2x10, 5 iso hold  Supine TrA bracing SB w/ alt overhead arm raise 2x10, 5 iso  hold  Supine mod dead bugs 2x10          Manual Therapy STM lumbar and thoracic paraspinals, R QL   STM lumbar and thoracic paraspinals, R QL, R piriformis     Therapeutic Exercise Minutes 15 30 21 45   Neuro Re-Educ Minutes  15 12    Manual Therapy Minutes 10  12    Evaluation Minutes 20      Total Time Of Timed Procedures 25 45 45 45   Total Time Of Service-Based Procedures 20 0 0 0   Total Treatment Time 45 45 45 45   HEP Access Code: [email or web address]  URL: [email or web address]  Date: 04/29/2025  Prepared by: Ysandra Walters    Exercises  - Supine Lower Trunk Rotation  - 1 x daily - 7 x weekly - 3 sets - 10 reps  - Seated Lumbar Flexion Stretch  - 1 x daily - 7 x weekly - 3 sets - 10 reps  - Hooklying Clamshell with Resistance  - 1 x daily - 7 x weekly - 2 sets - 10 reps  - Supine Bridge  - 1 x daily - 7 x weekly - 2 sets - 10 reps  - Supine Transversus Abdominis Bracing - Hands on Stomach  - 1 x daily - 7 x weekly - 2 sets - 10 reps Access Code: [email or web address]  URL: [email or web address]  Date: 05/01/2025  Prepared by: Ysandra Walters    Exercises  - Supine Lower Trunk Rotation  - 1 x daily - 7 x weekly - 3 sets - 10 reps  - Seated Lumbar Flexion Stretch  - 1 x daily - 7 x weekly - 3 sets - 10 reps  - Hooklying Clamshell with Resistance  - 1 x daily - 7 x weekly - 2 sets - 10 reps  - Supine Bridge  - 1 x daily - 7 x weekly - 2 sets - 10 reps  - Supine Transversus Abdominis Bracing - Hands on Stomach  - 1 x daily - 7 x weekly - 2 sets - 10 reps  - Hip Extension with Resistance Loop  - 1 x daily - 7 x weekly - 2 sets - 10 reps  - Hip Abduction with Resistance Loop  - 1 x daily - 7 x weekly - 2 sets - 10 reps  - Standing Hip Flexion with Resistance Loop  - 1 x daily - 7 x weekly - 2 sets - 10 reps  Access Code: [email or web address]  URL: [email or web address]  Date: 05/08/2025  Prepared by: Ysandra Walters    Exercises  - Supine Lower Trunk Rotation  - 1 x daily - 7 x weekly - 3 sets - 10  reps  - Seated Lumbar Flexion Stretch  - 1 x daily - 7 x weekly - 3 sets - 10 reps  - Hooklying Clamshell with Resistance  - 1 x daily - 7 x weekly - 2 sets - 10 reps  - Supine Bridge  - 1 x daily - 7 x weekly - 2 sets - 10 reps  - Supine Transversus Abdominis Bracing - Hands on Stomach  - 1 x daily - 7 x weekly - 2 sets - 10 reps  - Hip Extension with Resistance Loop  - 1 x daily - 7 x weekly - 2 sets - 10 reps  - Hip Abduction with Resistance Loop  - 1 x daily - 7 x weekly - 2 sets - 10 reps  - Standing Hip Flexion with Resistance Loop  - 1 x daily - 7 x weekly - 2 sets - 10 reps  - Supine Figure 4 Piriformis Stretch  - 1 x daily - 7 x weekly - 3 sets - 10 reps          HEP  Access Code: [email or web address]  URL: [email or web address]  Date: 05/08/2025  Prepared by: Amber Walters    Exercises  - Supine Lower Trunk Rotation  - 1 x daily - 7 x weekly - 3 sets - 10 reps  - Seated Lumbar Flexion Stretch  - 1 x daily - 7 x weekly - 3 sets - 10 reps  - Hooklying Clamshell with Resistance  - 1 x daily - 7 x weekly - 2 sets - 10 reps  - Supine Bridge  - 1 x daily - 7 x weekly - 2 sets - 10 reps  - Supine Transversus Abdominis Bracing - Hands on Stomach  - 1 x daily - 7 x weekly - 2 sets - 10 reps  - Hip Extension with Resistance Loop  - 1 x daily - 7 x weekly - 2 sets - 10 reps  - Hip Abduction with Resistance Loop  - 1 x daily - 7 x weekly - 2 sets - 10 reps  - Standing Hip Flexion with Resistance Loop  - 1 x daily - 7 x weekly - 2 sets - 10 reps  - Supine Figure 4 Piriformis Stretch  - 1 x daily - 7 x weekly - 3 sets - 10 reps      Charges  3 therex

## 2025-05-12 ENCOUNTER — OFFICE VISIT (OUTPATIENT)
Dept: PHYSICAL THERAPY | Age: 75
End: 2025-05-12
Attending: INTERNAL MEDICINE
Payer: MEDICARE

## 2025-05-12 PROCEDURE — 97110 THERAPEUTIC EXERCISES: CPT

## 2025-05-12 PROCEDURE — 97140 MANUAL THERAPY 1/> REGIONS: CPT

## 2025-05-12 NOTE — PROGRESS NOTES
Patient: Arcelia Skinner (74 year old, female) Referring Provider:  Insurance:   Diagnosis: Lumbar radicular pain (M54.16)  Right leg pain (M79.604) Shani Daniela DENG   Date of Surgery: None Next MD visit:  N/A   Precautions:  None 6/25/25 Referral Information:    Date of Evaluation: Req: 0, Auth: 0, Exp:     No data recorded POC Auth Visits:  8       Today's Date   5/12/2025    Subjective  Pt reporting the leg was hurting over the weekend. She notes the pain when she is sitting on the couch for about 10 minutes. She notes that she had difficulties going up and down the stairs over the weekend due to the pain.       Pain: 0/10     Objective             Assessment  HEP was updated due to 2 week gap in care, pt was put on waitlist. Discussed with pt how piriformis tightness can contribute to pain with prolonged sitting along with hip weakness that pt demonstrated at evaluation. Continued with hip strengthening. Will continue to progress per pt tolerance.    Goals (to be met in 8 visits)      Not Met Progress Toward Partially Met Met   Pt will improve transversus abdominis recruitment to perform proper isometric contraction without requiring verbal or tactile cuing to promote advancement of therex. [] [x] [] []   Pt will demonstrate good understanding of proper posture and body mechanics to decrease pain and improve spinal safety. [] [x] [] []   Pt will improve lumbar spine AROM flexion to >80 deg with no pain to allow increase ease with bending forward to don shoes. [] [x] [] []   Pt will report sleeping without waking up in middle of night >4/7 days of the week to be able to sleep with no pain and return to prior level of function.  [] [x] [] []   Pt will have decreased paraspinal mm tension to tolerate standing >30 minutes for home activities including cooking.  [] [x] [] []   Pt will report tolerating standing for >30 minutes with minimal to no pain to be able to perform household activities including  cleaning.  [] [x] [] []   Pt will demonstrate ascending and descending a flight of stairs with hand rail use to be able to navigate stairs at home and within the community.  [] [x] [] []   Patient will demonstrate a score of 63 or higher in the LEFS outcome measure to report significant difference. (Eval 53) [] [x] [] []   Pt will be independent and compliant with comprehensive HEP to maintain progress achieved in PT [] [x] [] []                          Plan       Treatment Last 4 Visits  Treatment Day: 5 5/1/2025 5/6/2025 5/8/2025 5/12/2025   Spine Treatment   Therapeutic Exercise NuStep level 5, 8 minutes     Supine bridges 2x10   Hooklying clamshells 2x10 red each   Supine SLR 2x10 each   S/l leg raises 2x10 each     Standing hip flexion 2x10 yellow, abd 2x10 yellow, hip ext 2x10 yellow     Side stepping yellow 10ft x6    HEP updated and reviewed       NuStep level 5, 8 minutes     Supine SLR 2x10 each   S/l leg raises 2x10 each     LTR x20, 5 iso hold     Standing hip flexion 2x10 yellow, abd 2x10 yellow, hip ext 2x10 yellow      NuStep level 5, 7 minutes     Lumbar/ hip assessment- special tests     Supine SLR 2x10 each   S/l leg raises 2x10 each   Bridge w/ ball squeeze 2x10     Hooklying clamshells 2x10 red unilat     Standing hip flexion 2x10 red, abd 2x10 red, hip ext 2x10 red     Side steps 10ft x6 red    Supine piriformis stretch 30 sec x4  Seated piriformis stretch 30 sec x4     HEP updated and reviewed- focusing on piriformis stretching  NuStep level 5, 8 minutes    Supine SLR 2x10 each   S/l leg raises 2x10 each   Prone hip ext 2x10 each   Bridge w/ ball squeeze 2x10     Hooklying clamshells 2x10 green unilat     Standing hip flexion 2x10 red, abd 2x10 red, hip ext 2x10 red     HEP updated and reviewed      Neuro Re-Education Supine TrA bracing SB 2x10, 5 iso hold   Supine TrA bracing SB w/ alt overhead arm raise 2x10, 5 iso hold  Supine mod dead bugs 2x10  Supine TrA bracing SB 2x10, 5 iso  hold  Supine TrA bracing SB w/ alt overhead arm raise 2x10, 5 iso hold  Supine mod dead bugs 2x10           Manual Therapy  STM lumbar and thoracic paraspinals, R QL, R piriformis   STM R piriformis   Therapeutic Exercise Minutes 30 21 45 35   Neuro Re-Educ Minutes 15 12     Manual Therapy Minutes  12  10   Total Time Of Timed Procedures 45 45 45 45   Total Time Of Service-Based Procedures 0 0 0 0   Total Treatment Time 45 45 45 45   HEP Access Code: [email or web address]  URL: [email or web address]  Date: 05/01/2025  Prepared by: Ysandra Walters    Exercises  - Supine Lower Trunk Rotation  - 1 x daily - 7 x weekly - 3 sets - 10 reps  - Seated Lumbar Flexion Stretch  - 1 x daily - 7 x weekly - 3 sets - 10 reps  - Hooklying Clamshell with Resistance  - 1 x daily - 7 x weekly - 2 sets - 10 reps  - Supine Bridge  - 1 x daily - 7 x weekly - 2 sets - 10 reps  - Supine Transversus Abdominis Bracing - Hands on Stomach  - 1 x daily - 7 x weekly - 2 sets - 10 reps  - Hip Extension with Resistance Loop  - 1 x daily - 7 x weekly - 2 sets - 10 reps  - Hip Abduction with Resistance Loop  - 1 x daily - 7 x weekly - 2 sets - 10 reps  - Standing Hip Flexion with Resistance Loop  - 1 x daily - 7 x weekly - 2 sets - 10 reps  Access Code: [email or web address]  URL: [email or web address]  Date: 05/08/2025  Prepared by: Ysandra Walters    Exercises  - Supine Lower Trunk Rotation  - 1 x daily - 7 x weekly - 3 sets - 10 reps  - Seated Lumbar Flexion Stretch  - 1 x daily - 7 x weekly - 3 sets - 10 reps  - Hooklying Clamshell with Resistance  - 1 x daily - 7 x weekly - 2 sets - 10 reps  - Supine Bridge  - 1 x daily - 7 x weekly - 2 sets - 10 reps  - Supine Transversus Abdominis Bracing - Hands on Stomach  - 1 x daily - 7 x weekly - 2 sets - 10 reps  - Hip Extension with Resistance Loop  - 1 x daily - 7 x weekly - 2 sets - 10 reps  - Hip Abduction with Resistance Loop  - 1 x daily - 7 x weekly - 2 sets - 10 reps  - Standing Hip  Flexion with Resistance Loop  - 1 x daily - 7 x weekly - 2 sets - 10 reps  - Supine Figure 4 Piriformis Stretch  - 1 x daily - 7 x weekly - 3 sets - 10 reps   Access Code: [email or web address]  URL: [email or web address]  Date: 05/12/2025  Prepared by: Ysandra Walters    Exercises  - Hooklying Clamshell with Resistance  - 1 x daily - 7 x weekly - 3 sets - 10 reps  - Supine Bridge  - 1 x daily - 7 x weekly - 3 sets - 10 reps  - Hip Extension with Resistance Loop  - 1 x daily - 7 x weekly - 3 sets - 10 reps  - Hip Abduction with Resistance Loop  - 1 x daily - 7 x weekly - 3 sets - 10 reps  - Standing Hip Flexion with Resistance Loop  - 1 x daily - 7 x weekly - 3 sets - 10 reps  - Supine Figure 4 Piriformis Stretch  - 1 x daily - 7 x weekly - 3 sets  - Supine Active Straight Leg Raise  - 1 x daily - 7 x weekly - 3 sets - 10 reps  - Sidelying Hip Abduction  - 1 x daily - 7 x weekly - 3 sets - 10 reps  - Prone Hip Extension  - 1 x daily - 7 x weekly - 3 sets - 10 reps  - Sit to Stand Without Arm Support  - 1 x daily - 7 x weekly - 3 sets - 10 reps  - Mini Squat with Counter Support  - 1 x daily - 7 x weekly - 3 sets - 10 reps        HEP  Access Code: [email or web address]  URL: [email or web address]  Date: 05/12/2025  Prepared by: Ysandra Walters    Exercises  - Hooklying Clamshell with Resistance  - 1 x daily - 7 x weekly - 3 sets - 10 reps  - Supine Bridge  - 1 x daily - 7 x weekly - 3 sets - 10 reps  - Hip Extension with Resistance Loop  - 1 x daily - 7 x weekly - 3 sets - 10 reps  - Hip Abduction with Resistance Loop  - 1 x daily - 7 x weekly - 3 sets - 10 reps  - Standing Hip Flexion with Resistance Loop  - 1 x daily - 7 x weekly - 3 sets - 10 reps  - Supine Figure 4 Piriformis Stretch  - 1 x daily - 7 x weekly - 3 sets  - Supine Active Straight Leg Raise  - 1 x daily - 7 x weekly - 3 sets - 10 reps  - Sidelying Hip Abduction  - 1 x daily - 7 x weekly - 3 sets - 10 reps  - Prone Hip Extension  - 1 x daily -  7 x weekly - 3 sets - 10 reps  - Sit to Stand Without Arm Support  - 1 x daily - 7 x weekly - 3 sets - 10 reps  - Mini Squat with Counter Support  - 1 x daily - 7 x weekly - 3 sets - 10 reps    Charges  1 manual, 2 therex

## 2025-05-29 ENCOUNTER — APPOINTMENT (OUTPATIENT)
Dept: PHYSICAL THERAPY | Age: 75
End: 2025-05-29
Attending: INTERNAL MEDICINE
Payer: MEDICARE

## 2025-06-02 RX ORDER — POTASSIUM CHLORIDE 750 MG/1
10 TABLET, EXTENDED RELEASE ORAL DAILY
Qty: 90 TABLET | Refills: 1 | Status: SHIPPED | OUTPATIENT
Start: 2025-06-02

## 2025-06-02 NOTE — TELEPHONE ENCOUNTER
Please review: medication fails/has no protocol attached.    Future Appointments   Date Time Provider Department Center   6/28/2025 10:20 AM Shani Suarez MD [facility]

## 2025-06-05 ENCOUNTER — OFFICE VISIT (OUTPATIENT)
Dept: PHYSICAL THERAPY | Age: 75
End: 2025-06-05
Attending: INTERNAL MEDICINE
Payer: MEDICARE

## 2025-06-05 PROCEDURE — 97110 THERAPEUTIC EXERCISES: CPT

## 2025-06-05 PROCEDURE — 97112 NEUROMUSCULAR REEDUCATION: CPT

## 2025-06-05 NOTE — PROGRESS NOTES
Discharge Summary  Pt has attended 6 visits in Physical Therapy.         Patient: Arcelia Skinner (75 year old, female) Referring Provider:  Insurance:   Diagnosis: Lumbar radicular pain (M54.16)  Right leg pain (M79.604) Shani Daniela  [facility]   Date of Surgery: None Next MD visit:  N/A   Precautions:  None 6/25/25 Referral Information:    Date of Evaluation: Req: 0, Auth: 0, Exp:     No data recorded POC Auth Visits:  8       Today's Date   6/5/2025    Subjective  Pt reporting she still gets pain to the back of the hip and radiating down the leg occasioanlly, not as freuquent as before. She has been keeping up with HEP.       Pain: 0/10     Objective          Trunk       4/29/2025 6/5/2025   Trunk ROM/MMT   Trunk Flex 74 WNL   Trunk Extension 10 WNL   Trunk Rt Side Bend WFL WNL   Trunk Lt Side Bend WFL WNL   Trunk Rt Rotation WFL WNL   Trunk Lt Rotation WFL WNL   , Hip       4/29/2025 6/5/2025   Hip ROM/MMT   Rt Hip Flexion MMT (L2) 3+ 4+   Lt Hip Flexion MMT (L2) 4- 4+   Rt Hip Extension MMT 3+ 4+   Lt Hip Extension MMT 3+ 4+   Rt Hip Abduction MMT 3+ 4+   Lt Hip Abduction MMT 3+ 4+   , Knee       4/29/2025 6/5/2025   Knee ROM/MMT   Rt Knee Flexion MMT 4- 4+   Lt Knee Flexion MMT 4- 4+   Rt Knee Extension MMT (L3) 4- 4+   Lt Knee Extension MMT (L3) 4- 4+   , Ankle/Foot       4/29/2025 6/5/2025   Ankle/Foot ROM/MMT   Rt Foot/Ank Pf MMT (S1) 4+ 4+   Lt Foot/Ank Pf MMT (S1) 4+ 4+   Rt Foot/Ank Df MMT (L4) 4+ 4+   Lt Foot/Ank Df MMT (L4) 4+ 4+        Assessment  Pt completing 6 physical therapy session for low back pain radiating to R leg. Pt demonstrating improvements in lumbar ROM and LE strength. Pt reporting improvements in being able to tolerate standing and walking for longer periods of time, navigating stairs, performing household activities with less pain, and overall a reduction in pain and symptoms. Pt met 7/9 goals established at initial evaluation. HEP was updated and reviewed with pt. Discussing the  importance of keeping up with HEP to maintain gains made from therapy thus far. Pt verbalized understanding. Pt was discharged from therapy at this time and will be following up with doctor in upcoming weeks.       Goals (to be met in 8 visits)      Not Met Progress Toward Partially Met Met   Pt will improve transversus abdominis recruitment to perform proper isometric contraction without requiring verbal or tactile cuing to promote advancement of therex. [] [] [] [x]   Pt will demonstrate good understanding of proper posture and body mechanics to decrease pain and improve spinal safety. [] [] [] [x]   Pt will improve lumbar spine AROM flexion to >80 deg with no pain to allow increase ease with bending forward to don shoes. [] [] [] [x]   Pt will report sleeping without waking up in middle of night >4/7 days of the week to be able to sleep with no pain and return to prior level of function.  [] [] [] [x]   Pt will have decreased paraspinal mm tension to tolerate standing >30 minutes for home activities including cooking.  [] [] [x] []   Pt will report tolerating standing for >30 minutes with minimal to no pain to be able to perform household activities including cleaning.  [] [] [x] []   Pt will demonstrate ascending and descending a flight of stairs with hand rail use to be able to navigate stairs at home and within the community.  [] [] [] [x]   Patient will demonstrate a score of 63 or higher in the LEFS outcome measure to report significant difference. (Eval 53) [] [] [] [x]   Pt will be independent and compliant with comprehensive HEP to maintain progress achieved in PT [] [] [] [x]                              Plan  discharge    Treatment Last 4 Visits  Treatment Day: 6 5/6/2025 5/8/2025 5/12/2025 6/5/2025   Spine Treatment   Therapeutic Exercise NuStep level 5, 8 minutes     Supine SLR 2x10 each   S/l leg raises 2x10 each     LTR x20, 5 iso hold     Standing hip flexion 2x10 yellow, abd 2x10 yellow, hip  ext 2x10 yellow      NuStep level 5, 7 minutes     Lumbar/ hip assessment- special tests     Supine SLR 2x10 each   S/l leg raises 2x10 each   Bridge w/ ball squeeze 2x10     Hooklying clamshells 2x10 red unilat     Standing hip flexion 2x10 red, abd 2x10 red, hip ext 2x10 red     Side steps 10ft x6 red    Supine piriformis stretch 30 sec x4  Seated piriformis stretch 30 sec x4     HEP updated and reviewed- focusing on piriformis stretching  NuStep level 5, 8 minutes    Supine SLR 2x10 each   S/l leg raises 2x10 each   Prone hip ext 2x10 each   Bridge w/ ball squeeze 2x10     Hooklying clamshells 2x10 green unilat     Standing hip flexion 2x10 red, abd 2x10 red, hip ext 2x10 red     HEP updated and reviewed    NuStep level 5, 8 minutes    DC assessment    Hooklying clamshells 2x10 green unilat     Standing hip flexion 2x10 red, abd 2x10 red, hip ext 2x10 red     Sit to stands no UE support 2x10      Neuro Re-Education Supine TrA bracing SB 2x10, 5 iso hold  Supine TrA bracing SB w/ alt overhead arm raise 2x10, 5 iso hold  Supine mod dead bugs 2x10         Supine tra bracing 2x10, 5 iso hold   Supine mod deadbugs 2x10 bent knees   Supine marching 2x10     HEP updated and reviewed    Manual Therapy STM lumbar and thoracic paraspinals, R QL, R piriformis   STM R piriformis    Therapeutic Exercise Minutes 21 45 35 30   Neuro Re-Educ Minutes 12   15   Manual Therapy Minutes 12  10    Total Time Of Timed Procedures 45 45 45 45   Total Time Of Service-Based Procedures 0 0 0 0   Total Treatment Time 45 45 45 45   HEP  Access Code: [email or web address]  URL: [email or web address]  Date: 05/08/2025  Prepared by: Amber Walters    Exercises  - Supine Lower Trunk Rotation  - 1 x daily - 7 x weekly - 3 sets - 10 reps  - Seated Lumbar Flexion Stretch  - 1 x daily - 7 x weekly - 3 sets - 10 reps  - Hooklying Clamshell with Resistance  - 1 x daily - 7 x weekly - 2 sets - 10 reps  - Supine Bridge  - 1 x daily - 7 x weekly - 2  sets - 10 reps  - Supine Transversus Abdominis Bracing - Hands on Stomach  - 1 x daily - 7 x weekly - 2 sets - 10 reps  - Hip Extension with Resistance Loop  - 1 x daily - 7 x weekly - 2 sets - 10 reps  - Hip Abduction with Resistance Loop  - 1 x daily - 7 x weekly - 2 sets - 10 reps  - Standing Hip Flexion with Resistance Loop  - 1 x daily - 7 x weekly - 2 sets - 10 reps  - Supine Figure 4 Piriformis Stretch  - 1 x daily - 7 x weekly - 3 sets - 10 reps   Access Code: [email or web address]  URL: [email or web address]  Date: 05/12/2025  Prepared by: Amber Walters    Exercises  - Hooklying Clamshell with Resistance  - 1 x daily - 7 x weekly - 3 sets - 10 reps  - Supine Bridge  - 1 x daily - 7 x weekly - 3 sets - 10 reps  - Hip Extension with Resistance Loop  - 1 x daily - 7 x weekly - 3 sets - 10 reps  - Hip Abduction with Resistance Loop  - 1 x daily - 7 x weekly - 3 sets - 10 reps  - Standing Hip Flexion with Resistance Loop  - 1 x daily - 7 x weekly - 3 sets - 10 reps  - Supine Figure 4 Piriformis Stretch  - 1 x daily - 7 x weekly - 3 sets  - Supine Active Straight Leg Raise  - 1 x daily - 7 x weekly - 3 sets - 10 reps  - Sidelying Hip Abduction  - 1 x daily - 7 x weekly - 3 sets - 10 reps  - Prone Hip Extension  - 1 x daily - 7 x weekly - 3 sets - 10 reps  - Sit to Stand Without Arm Support  - 1 x daily - 7 x weekly - 3 sets - 10 reps  - Mini Squat with Counter Support  - 1 x daily - 7 x weekly - 3 sets - 10 reps Access Code: [email or web address]  URL: [email or web address]  Date: 06/05/2025  Prepared by: Ysandbonnie Walters    Exercises  - Supine Transversus Abdominis Bracing - Hands on Stomach  - 1 x daily - 7 x weekly - 3 sets - 10 reps  - Dead Bug  - 1 x daily - 7 x weekly - 3 sets - 10 reps  - Supine March  - 1 x daily - 7 x weekly - 3 sets - 10 reps  - Hooklying Clamshell with Resistance  - 1 x daily - 7 x weekly - 3 sets - 10 reps  - Supine Bridge  - 1 x daily - 7 x weekly - 3 sets - 10 reps  - Hip  Extension with Resistance Loop  - 1 x daily - 7 x weekly - 3 sets - 10 reps  - Hip Abduction with Resistance Loop  - 1 x daily - 7 x weekly - 3 sets - 10 reps  - Standing Hip Flexion with Resistance Loop  - 1 x daily - 7 x weekly - 3 sets - 10 reps  - Supine Figure 4 Piriformis Stretch  - 1 x daily - 7 x weekly - 3 sets  - Supine Active Straight Leg Raise  - 1 x daily - 7 x weekly - 3 sets - 10 reps  - Sidelying Hip Abduction  - 1 x daily - 7 x weekly - 3 sets - 10 reps  - Prone Hip Extension  - 1 x daily - 7 x weekly - 3 sets - 10 reps  - Sit to Stand Without Arm Support  - 1 x daily - 7 x weekly - 3 sets - 10 reps  - Mini Squat with Counter Support  - 1 x daily - 7 x weekly - 3 sets - 10 reps  - Side Stepping with Resistance at Ankles  - 1 x daily - 7 x weekly - 3 sets - 10 reps        HEP  Access Code: [email or web address]  URL: [email or web address]  Date: 06/05/2025  Prepared by: Amber Walters    Exercises  - Supine Transversus Abdominis Bracing - Hands on Stomach  - 1 x daily - 7 x weekly - 3 sets - 10 reps  - Dead Bug  - 1 x daily - 7 x weekly - 3 sets - 10 reps  - Supine March  - 1 x daily - 7 x weekly - 3 sets - 10 reps  - Hooklying Clamshell with Resistance  - 1 x daily - 7 x weekly - 3 sets - 10 reps  - Supine Bridge  - 1 x daily - 7 x weekly - 3 sets - 10 reps  - Hip Extension with Resistance Loop  - 1 x daily - 7 x weekly - 3 sets - 10 reps  - Hip Abduction with Resistance Loop  - 1 x daily - 7 x weekly - 3 sets - 10 reps  - Standing Hip Flexion with Resistance Loop  - 1 x daily - 7 x weekly - 3 sets - 10 reps  - Supine Figure 4 Piriformis Stretch  - 1 x daily - 7 x weekly - 3 sets  - Supine Active Straight Leg Raise  - 1 x daily - 7 x weekly - 3 sets - 10 reps  - Sidelying Hip Abduction  - 1 x daily - 7 x weekly - 3 sets - 10 reps  - Prone Hip Extension  - 1 x daily - 7 x weekly - 3 sets - 10 reps  - Sit to Stand Without Arm Support  - 1 x daily - 7 x weekly - 3 sets - 10 reps  - Mini Squat with  Counter Support  - 1 x daily - 7 x weekly - 3 sets - 10 reps  - Side Stepping with Resistance at Ankles  - 1 x daily - 7 x weekly - 3 sets - 10 reps    Charges  2 therex, 1 neuro re-ed                     Post LEFS Score  Post LEFS Score: (Patient-Rptd) 67.5 % (6/5/2025 10:18 AM)    13.75 % improvement    Plan: Discharged       Patient/Family/Caregiver was advised of these findings, precautions, and treatment options and has agreed to actively participate in planning and for this course of care.    Thank you for your referral. If you have any questions, please contact me at Dept: [phone].    Sincerely,  Electronically signed by therapist: Amber Walters PT     Physician's certification required:  Yes  Please co-sign or sign and return this letter via fax as soon as possible to [phone].   I certify the need for these services furnished under this plan of treatment and while under my care.    X___________________________________________________ Date____________________    Certification From: 6/5/2025  To:9/3/2025

## 2025-06-25 ENCOUNTER — OFFICE VISIT (OUTPATIENT)
Dept: INTERNAL MEDICINE CLINIC | Facility: CLINIC | Age: 75
End: 2025-06-25
Payer: MEDICARE

## 2025-06-25 VITALS
WEIGHT: 130.38 LBS | HEART RATE: 66 BPM | TEMPERATURE: 99 F | BODY MASS INDEX: 23.99 KG/M2 | SYSTOLIC BLOOD PRESSURE: 135 MMHG | RESPIRATION RATE: 16 BRPM | HEIGHT: 61.81 IN | DIASTOLIC BLOOD PRESSURE: 85 MMHG | OXYGEN SATURATION: 98 %

## 2025-06-25 DIAGNOSIS — E55.9 VITAMIN D DEFICIENCY: ICD-10-CM

## 2025-06-25 DIAGNOSIS — M54.16 LUMBAR RADICULAR PAIN: Primary | ICD-10-CM

## 2025-06-25 DIAGNOSIS — E78.00 PURE HYPERCHOLESTEROLEMIA: ICD-10-CM

## 2025-06-25 DIAGNOSIS — I10 ESSENTIAL HYPERTENSION: ICD-10-CM

## 2025-06-25 PROBLEM — R00.2 PALPITATIONS: Status: RESOLVED | Noted: 2023-12-12 | Resolved: 2025-06-25

## 2025-06-25 PROCEDURE — 1159F MED LIST DOCD IN RCRD: CPT | Performed by: INTERNAL MEDICINE

## 2025-06-25 PROCEDURE — 99214 OFFICE O/P EST MOD 30 MIN: CPT | Performed by: INTERNAL MEDICINE

## 2025-06-25 PROCEDURE — G2211 COMPLEX E/M VISIT ADD ON: HCPCS | Performed by: INTERNAL MEDICINE

## 2025-06-25 NOTE — PROGRESS NOTES
The following individual(s) verbally consented to be recorded using ambient AI listening technology and understand that they can each withdraw their consent to this listening technology at any point by asking the clinician to turn off or pause the recording:    Patient name: Arcelia Skinner  Subjective:   Arcelia Skinner is a 75 year old female who presents for Follow - Up (ES rm - 4 - f/u for HTN)     History/Other:   History of Present Illness  Arcelia Skinner is a 75 year old female with osteoarthritis who presents for follow up.    She reports right back and Right leg pain are better since doing PT. She does sometimes have right knee pain, now rating it as 1-2 out of 10, primarily occurring at night, especially when sleeping. She occasionally takes ibuprofen PM or Tylenol PM for relief. Exercises provided by her physical therapist have been beneficial.    Patient had few pound weight gain in the last OV. TSH checked and it was WNL. She has lost back the weight, attributed to staying active with exercises and occasional walks. Her BP taken manually was 135/85. She is compliant with all medications including simvastatin.  No HA/DZ/CP     She is planning a trip to [place] in July to visit family.     Chief Complaint Reviewed and Verified  Nursing Notes Reviewed and   Verified  Tobacco Reviewed  Allergies Reviewed  Medications Reviewed    Problem List Reviewed  Medical History Reviewed  Surgical History   Reviewed  OB Status Reviewed  Family History Reviewed         Tobacco:  She has never smoked tobacco.    Current Medications[1]      Review of Systems:  Review of Systems  Improved back pain, no f/c/cough/CP/palp/SOB/N/V/DZ/falls    Objective:   /85   Pulse 66   Temp 99.4 °F (37.4 °C) (Temporal)   Resp 16   Ht 5' 1.81\" (1.57 m)   Wt 130 lb 6.4 oz (59.1 kg)   LMP  (LMP Unknown)   SpO2 98%   BMI 24.00 kg/m²  Estimated body mass index is 24 kg/m² as calculated from the following:     Height as of this encounter: 5' 1.81\" (1.57 m).    Weight as of this encounter: 130 lb 6.4 oz (59.1 kg).  Physical Exam  Gen: NAD, appears stated age  HEENT: PERRL, EOMI, OP-clear, TMs- WNL  NECK: supple, no thyromegaly or JVD  CV: Regular, nl S1 S2  RESP: Clear to auscultation bilaterally  ABD: soft, NT, ND, +BS  EXT: no clubbing, cyanosis, or edema  Spine: no TTP over the spine, +SLR on right  NEURO: Alert and oriented  Results         Assessment & Plan:   1. Lumbar radicular pain/lumbar arthritis- improving after PT, patient will continue doing exercises at home. Can use OTC tylenol prn pain as well as heat packs prn  2. Essential hypertension- controlled, CPM  3. Pure hypercholesterolemia- continue simvastatin, check lipids prior to wellness exam  -     Lipid Panel; Future; Expected date: 09/01/2025  4. Vitamin D deficiency- otc vitamin D supplement, check level  -     Vitamin D, 25-Hydroxy; Future; Expected date: 09/01/2025    Assessment & Plan                Return in about 3 months (around 9/25/2025), or if symptoms worsen or fail to improve, for wellness.      Shani Suarez MD, 6/25/2025, 11:02 AM               [1]   Current Outpatient Medications   Medication Sig Dispense Refill    Potassium Chloride ER 10 MEQ Oral Tab CR Take 1 tablet (10 mEq total) by mouth daily. 90 tablet 1    losartan-hydroCHLOROthiazide 100-25 MG Oral Tab Take 1 tablet by mouth daily. 90 tablet 1    Omeprazole 40 MG Oral Capsule Delayed Release Take 1 capsule (40 mg total) by mouth daily. 1/2 hour prior to breakfast 90 capsule 3    metoprolol succinate  MG Oral Tablet 24 Hr Take 1 tablet (100 mg total) by mouth 2 (two) times daily. 180 tablet 3    Triamterene-HCTZ 37.5-25 MG Oral Tab Take 1 tablet by mouth daily. 90 tablet 3    simvastatin 40 MG Oral Tab Take 1 tablet (40 mg total) by mouth daily. 90 tablet 3    Clobetasol Propionate 0.05 % External Ointment apply nightly to affected area x1 week, then every other night x1  week, then twice a week x1 week. 60 g 0    triamcinolone acetonide 0.1 % External Cream       clonazePAM 0.25 MG Oral Tablet Dispersible Take 1 tablet (0.25 mg total) by mouth as needed.      venlafaxine  MG Oral Capsule SR 24 Hr Take 1 capsule (150 mg total) by mouth daily. 180 capsule 0

## 2025-07-07 RX ORDER — SIMVASTATIN 40 MG
40 TABLET ORAL DAILY
Qty: 90 TABLET | Refills: 3 | Status: SHIPPED | OUTPATIENT
Start: 2025-07-07

## (undated) DEVICE — GOWN,SIRUS,FABRIC-REINFORCED,X-LARGE: Brand: MEDLINE

## (undated) DEVICE — Device

## (undated) DEVICE — CANNULA SEAL

## (undated) DEVICE — 40580 - THE PINK PAD - ADVANCED TRENDELENBURG POSITIONING KIT: Brand: 40580 - THE PINK PAD - ADVANCED TRENDELENBURG POSITIONING KIT

## (undated) DEVICE — COVER,MAYO STAND,STERILE: Brand: MEDLINE

## (undated) DEVICE — CADIERE FORCEPS: Brand: ENDOWRIST

## (undated) DEVICE — 1200CC GUARDIAN II: Brand: GUARDIAN

## (undated) DEVICE — PAD SACRAL SPAN AID

## (undated) DEVICE — MEGA SUTURECUT ND: Brand: ENDOWRIST

## (undated) DEVICE — ARM DRAPE

## (undated) DEVICE — UNDYED BRAIDED (POLYGLACTIN 910), SYNTHETIC ABSORBABLE SUTURE: Brand: COATED VICRYL

## (undated) DEVICE — ENDOPATH ULTRA VERESS INSUFFLATION NEEDLES WITH LUER LOCK CONNECTORS: Brand: ENDOPATH

## (undated) DEVICE — Device: Brand: DEFENDO AIR/WATER/SUCTION AND BIOPSY VALVE

## (undated) DEVICE — BLADELESS OBTURATOR: Brand: WECK VISTA

## (undated) DEVICE — TIP-UP FENESTRATED GRASPER: Brand: ENDOWRIST

## (undated) DEVICE — STRL PENROSE DRAIN 18" X 1/4": Brand: CARDINAL HEALTH

## (undated) DEVICE — LUBRICANT JLY SURGILUBE 2OZ

## (undated) DEVICE — CATH BALLOON CRE 18-20MM 5838

## (undated) DEVICE — TROCAR: Brand: KII FIOS FIRST ENTRY

## (undated) DEVICE — FORCEP RADIAL JAW 4

## (undated) DEVICE — SUTURE VLOC NONAB 2-0 6\" 0605

## (undated) DEVICE — MONOPOLAR CURVED SCISSORS: Brand: ENDOWRIST

## (undated) DEVICE — STERILE POLYISOPRENE POWDER-FREE SURGICAL GLOVES: Brand: PROTEXIS

## (undated) DEVICE — VISUALIZATION SYSTEM: Brand: CLEARIFY

## (undated) DEVICE — 3M™ RED DOT™ MONITORING ELECTRODE WITH FOAM TAPE AND STICKY GEL, 50/BAG, 20/CASE, 72/PLT 2570: Brand: RED DOT™

## (undated) DEVICE — ENDOSCOPY PACK UPPER: Brand: MEDLINE INDUSTRIES, INC.

## (undated) DEVICE — FILTERLINE NASAL ADULT O2/CO2

## (undated) DEVICE — LARGE HEM-O-LOK CLIP APPLIER: Brand: ENDOWRIST

## (undated) DEVICE — COLUMN DRAPE

## (undated) DEVICE — VESSEL SEALER EXTEND: Brand: ENDOWRIST

## (undated) DEVICE — KENDALL SCD EXPRESS SLEEVES, KNEE LENGTH, MEDIUM: Brand: KENDALL SCD

## (undated) DEVICE — ENDOSCOPY PACK - LOWER: Brand: MEDLINE INDUSTRIES, INC.

## (undated) DEVICE — TIP COVER ACCESSORY

## (undated) DEVICE — LAP CHOLE/APPY CDS-LF: Brand: MEDLINE INDUSTRIES, INC.

## (undated) DEVICE — FENESTRATED BIPOLAR FORCEPS: Brand: ENDOWRIST

## (undated) DEVICE — SYRINGE/GUAGE ASSEMBLY

## (undated) DEVICE — SOL  .9 1000ML BAG

## (undated) NOTE — LETTER
[facility] [address] [facility]  [place] [facility]    Consent for Operation  Date: __________________                                Time: _______________    1.  I authorize the performance upon Mark Gama the following operation:  Procedure( procedure has been videotaped, the surgeon will obtain the original videotape. The hospital will not be responsible for storage or maintenance of this tape.   7. For the purpose of advancing medical education, I consent to the admittance of observers to the STATEMENTS REQUIRING INSERTION OR COMPLETION WERE FILLED IN.     Signature of Patient:   ___________________________    When the patient is a minor or mentally incompetent to give consent:  Signature of person authorized to consent for patient: ____________ supplements, and pills I can buy without a prescription (including street drugs/illegal medications). Failure to inform my anesthesiologist about these medicines may increase my risk of anesthetic complications. iv.  If I am allergic to anything or have ha Anesthesiologist Signature     Date   Time  I have discussed the procedure and information above with the patient (or patient’s representative) and answered their questions. The patient or their representative has agreed to have anesthesia services.     ___

## (undated) NOTE — LETTER
[facility]  Phone: [phone]  Right Fax: [phone]  [facility] By:  Natalie Hurt RN Date: 10/17/19    Patient Name: Lata Sarah  Surgery Date: 10/21/2019    CSN: [id number]  Medical Record: [id number]

## (undated) NOTE — LETTER
[facility]  Phone: [phone]  Right Fax: [phone]  [address] [place] [address]    Sent Kat Aguilar RN Date: 10/18/19    Patient Name: Hayder Suazo  Surgery Date: 10/21/2019    CSN: [id number]  Medical Record: [id number]